# Patient Record
Sex: FEMALE | Race: WHITE | NOT HISPANIC OR LATINO
[De-identification: names, ages, dates, MRNs, and addresses within clinical notes are randomized per-mention and may not be internally consistent; named-entity substitution may affect disease eponyms.]

---

## 2017-02-07 ENCOUNTER — APPOINTMENT (OUTPATIENT)
Dept: SURGERY | Facility: CLINIC | Age: 32
End: 2017-02-07

## 2017-02-07 VITALS
HEIGHT: 69 IN | TEMPERATURE: 98.2 F | SYSTOLIC BLOOD PRESSURE: 127 MMHG | DIASTOLIC BLOOD PRESSURE: 86 MMHG | WEIGHT: 220 LBS | HEART RATE: 84 BPM | BODY MASS INDEX: 32.58 KG/M2

## 2017-02-08 ENCOUNTER — APPOINTMENT (OUTPATIENT)
Dept: ULTRASOUND IMAGING | Facility: CLINIC | Age: 32
End: 2017-02-08

## 2017-02-08 ENCOUNTER — OUTPATIENT (OUTPATIENT)
Dept: OUTPATIENT SERVICES | Facility: HOSPITAL | Age: 32
LOS: 1 days | End: 2017-02-08
Payer: COMMERCIAL

## 2017-02-08 DIAGNOSIS — Z00.8 ENCOUNTER FOR OTHER GENERAL EXAMINATION: ICD-10-CM

## 2017-02-08 PROCEDURE — 76700 US EXAM ABDOM COMPLETE: CPT

## 2017-02-10 ENCOUNTER — OUTPATIENT (OUTPATIENT)
Dept: OUTPATIENT SERVICES | Facility: HOSPITAL | Age: 32
LOS: 1 days | End: 2017-02-10

## 2017-02-10 VITALS
HEIGHT: 67 IN | HEART RATE: 68 BPM | RESPIRATION RATE: 16 BRPM | WEIGHT: 223.11 LBS | TEMPERATURE: 97 F | DIASTOLIC BLOOD PRESSURE: 76 MMHG | SYSTOLIC BLOOD PRESSURE: 104 MMHG

## 2017-02-10 DIAGNOSIS — K80.20 CALCULUS OF GALLBLADDER WITHOUT CHOLECYSTITIS WITHOUT OBSTRUCTION: ICD-10-CM

## 2017-02-10 DIAGNOSIS — N83.209 UNSPECIFIED OVARIAN CYST, UNSPECIFIED SIDE: Chronic | ICD-10-CM

## 2017-02-10 DIAGNOSIS — Z90.89 ACQUIRED ABSENCE OF OTHER ORGANS: Chronic | ICD-10-CM

## 2017-02-10 LAB
ALBUMIN SERPL ELPH-MCNC: 4.4 G/DL — SIGNIFICANT CHANGE UP (ref 3.3–5)
ALP SERPL-CCNC: 64 U/L — SIGNIFICANT CHANGE UP (ref 40–120)
ALT FLD-CCNC: 11 U/L — SIGNIFICANT CHANGE UP (ref 4–33)
AST SERPL-CCNC: 16 U/L — SIGNIFICANT CHANGE UP (ref 4–32)
BILIRUB SERPL-MCNC: 0.3 MG/DL — SIGNIFICANT CHANGE UP (ref 0.2–1.2)
BLD GP AB SCN SERPL QL: NEGATIVE — SIGNIFICANT CHANGE UP
BUN SERPL-MCNC: 9 MG/DL — SIGNIFICANT CHANGE UP (ref 7–23)
CALCIUM SERPL-MCNC: 9.1 MG/DL — SIGNIFICANT CHANGE UP (ref 8.4–10.5)
CHLORIDE SERPL-SCNC: 104 MMOL/L — SIGNIFICANT CHANGE UP (ref 98–107)
CO2 SERPL-SCNC: 25 MMOL/L — SIGNIFICANT CHANGE UP (ref 22–31)
CREAT SERPL-MCNC: 0.64 MG/DL — SIGNIFICANT CHANGE UP (ref 0.5–1.3)
GLUCOSE SERPL-MCNC: 59 MG/DL — LOW (ref 70–99)
HCT VFR BLD CALC: 41.3 % — SIGNIFICANT CHANGE UP (ref 34.5–45)
HGB BLD-MCNC: 13.9 G/DL — SIGNIFICANT CHANGE UP (ref 11.5–15.5)
MCHC RBC-ENTMCNC: 30.2 PG — SIGNIFICANT CHANGE UP (ref 27–34)
MCHC RBC-ENTMCNC: 33.7 % — SIGNIFICANT CHANGE UP (ref 32–36)
MCV RBC AUTO: 89.6 FL — SIGNIFICANT CHANGE UP (ref 80–100)
PLATELET # BLD AUTO: 303 K/UL — SIGNIFICANT CHANGE UP (ref 150–400)
PMV BLD: 11.1 FL — SIGNIFICANT CHANGE UP (ref 7–13)
POTASSIUM SERPL-MCNC: 4 MMOL/L — SIGNIFICANT CHANGE UP (ref 3.5–5.3)
POTASSIUM SERPL-SCNC: 4 MMOL/L — SIGNIFICANT CHANGE UP (ref 3.5–5.3)
PROT SERPL-MCNC: 7.5 G/DL — SIGNIFICANT CHANGE UP (ref 6–8.3)
RBC # BLD: 4.61 M/UL — SIGNIFICANT CHANGE UP (ref 3.8–5.2)
RBC # FLD: 13 % — SIGNIFICANT CHANGE UP (ref 10.3–14.5)
RH IG SCN BLD-IMP: POSITIVE — SIGNIFICANT CHANGE UP
SODIUM SERPL-SCNC: 143 MMOL/L — SIGNIFICANT CHANGE UP (ref 135–145)
WBC # BLD: 8.1 K/UL — SIGNIFICANT CHANGE UP (ref 3.8–10.5)
WBC # FLD AUTO: 8.1 K/UL — SIGNIFICANT CHANGE UP (ref 3.8–10.5)

## 2017-02-10 RX ORDER — SODIUM CHLORIDE 9 MG/ML
1000 INJECTION, SOLUTION INTRAVENOUS
Qty: 0 | Refills: 0 | Status: DISCONTINUED | OUTPATIENT
Start: 2017-02-15 | End: 2017-03-02

## 2017-02-10 NOTE — H&P PST ADULT - HISTORY OF PRESENT ILLNESS
32 y/o female presents stating that she had RUQ pain couple week ago, went to [facility], had sonogram done & multiple gallstone see. Pt was referred for further evaluation; seen by Dr Storey, now scheduled for Laparoscopic cholecystectomy, possible open procedure on 02/15/17.  Pt currently breast feeding.

## 2017-02-10 NOTE — H&P PST ADULT - PROBLEM SELECTOR PLAN 1
scheduled for lap mike, possible open on 02/15/17   pending labs; surgical scrub & preop instructions given &e xpleined, pt verbalized understanding

## 2017-02-10 NOTE — H&P PST ADULT - FAMILY HISTORY
Mother  Still living? Yes, Estimated age: Age Unknown  Family history of hypertension in mother, Age at diagnosis: Age Unknown

## 2017-02-10 NOTE — H&P PST ADULT - NEGATIVE GASTROINTESTINAL SYMPTOMS
no vomiting/no constipation/no abdominal pain/no diarrhea/no change in bowel habits/no melena/no nausea

## 2017-02-10 NOTE — H&P PST ADULT - NEGATIVE CARDIOVASCULAR SYMPTOMS
no chest pain/no palpitations/no paroxysmal nocturnal dyspnea/no orthopnea/no dyspnea on exertion/no peripheral edema

## 2017-02-10 NOTE — H&P PST ADULT - NSANTHOSAYNRD_GEN_A_CORE
No. DORIS screening performed.  STOP BANG Legend: 0-2 = LOW Risk; 3-4 = INTERMEDIATE Risk; 5-8 = HIGH Risk

## 2017-02-10 NOTE — H&P PST ADULT - NEGATIVE FEMALE-SPECIFIC SYMPTOMS
no abnormal vaginal bleeding/no menorrhagia/no pelvic pain/no vaginal discharge/no dysmenorrhea/no irregular menses/no spotting

## 2017-02-10 NOTE — H&P PST ADULT - RS GEN PE MLT RESP DETAILS PC
no rales/clear to auscultation bilaterally/airway patent/no rhonchi/breath sounds equal/respirations non-labored/good air movement

## 2017-02-14 ENCOUNTER — RESULT REVIEW (OUTPATIENT)
Age: 32
End: 2017-02-14

## 2017-02-15 ENCOUNTER — TRANSCRIPTION ENCOUNTER (OUTPATIENT)
Age: 32
End: 2017-02-15

## 2017-02-15 ENCOUNTER — OUTPATIENT (OUTPATIENT)
Dept: OUTPATIENT SERVICES | Facility: HOSPITAL | Age: 32
LOS: 1 days | Discharge: HOME | End: 2017-02-15
Payer: COMMERCIAL

## 2017-02-15 ENCOUNTER — APPOINTMENT (OUTPATIENT)
Dept: SURGERY | Facility: HOSPITAL | Age: 32
End: 2017-02-15

## 2017-02-15 VITALS
HEART RATE: 76 BPM | RESPIRATION RATE: 18 BRPM | WEIGHT: 223.11 LBS | DIASTOLIC BLOOD PRESSURE: 69 MMHG | OXYGEN SATURATION: 98 % | SYSTOLIC BLOOD PRESSURE: 124 MMHG | HEIGHT: 67 IN | TEMPERATURE: 98 F

## 2017-02-15 VITALS
SYSTOLIC BLOOD PRESSURE: 118 MMHG | HEART RATE: 76 BPM | RESPIRATION RATE: 16 BRPM | DIASTOLIC BLOOD PRESSURE: 71 MMHG | OXYGEN SATURATION: 98 %

## 2017-02-15 DIAGNOSIS — N83.209 UNSPECIFIED OVARIAN CYST, UNSPECIFIED SIDE: Chronic | ICD-10-CM

## 2017-02-15 DIAGNOSIS — Z90.89 ACQUIRED ABSENCE OF OTHER ORGANS: Chronic | ICD-10-CM

## 2017-02-15 DIAGNOSIS — K80.10 CALCULUS OF GALLBLADDER WITH CHRONIC CHOLECYSTITIS WITHOUT OBSTRUCTION: ICD-10-CM

## 2017-02-15 LAB
HCG UR QL: NEGATIVE — SIGNIFICANT CHANGE UP
RH IG SCN BLD-IMP: POSITIVE — SIGNIFICANT CHANGE UP

## 2017-02-15 PROCEDURE — 47562 LAPAROSCOPIC CHOLECYSTECTOMY: CPT

## 2017-02-15 PROCEDURE — 88304 TISSUE EXAM BY PATHOLOGIST: CPT | Mod: 26

## 2017-02-15 RX ORDER — OXYCODONE HYDROCHLORIDE 5 MG/1
10 TABLET ORAL EVERY 4 HOURS
Qty: 0 | Refills: 0 | Status: DISCONTINUED | OUTPATIENT
Start: 2017-02-15 | End: 2017-02-15

## 2017-02-15 RX ORDER — OXYCODONE HYDROCHLORIDE 5 MG/1
5 TABLET ORAL EVERY 4 HOURS
Qty: 0 | Refills: 0 | Status: DISCONTINUED | OUTPATIENT
Start: 2017-02-15 | End: 2017-02-15

## 2017-02-15 RX ORDER — ONDANSETRON 8 MG/1
4 TABLET, FILM COATED ORAL ONCE
Qty: 0 | Refills: 0 | Status: COMPLETED | OUTPATIENT
Start: 2017-02-15 | End: 2017-02-15

## 2017-02-15 RX ORDER — OXYCODONE HYDROCHLORIDE 5 MG/1
1 TABLET ORAL
Qty: 10 | Refills: 0 | OUTPATIENT
Start: 2017-02-15

## 2017-02-15 RX ORDER — FENTANYL CITRATE 50 UG/ML
50 INJECTION INTRAVENOUS
Qty: 0 | Refills: 0 | Status: DISCONTINUED | OUTPATIENT
Start: 2017-02-15 | End: 2017-02-16

## 2017-02-15 RX ORDER — SODIUM CHLORIDE 9 MG/ML
1000 INJECTION, SOLUTION INTRAVENOUS ONCE
Qty: 0 | Refills: 0 | Status: COMPLETED | OUTPATIENT
Start: 2017-02-15 | End: 2017-02-15

## 2017-02-15 RX ADMIN — ONDANSETRON 4 MILLIGRAM(S): 8 TABLET, FILM COATED ORAL at 18:50

## 2017-02-15 RX ADMIN — OXYCODONE HYDROCHLORIDE 10 MILLIGRAM(S): 5 TABLET ORAL at 18:31

## 2017-02-15 RX ADMIN — OXYCODONE HYDROCHLORIDE 10 MILLIGRAM(S): 5 TABLET ORAL at 19:00

## 2017-02-15 RX ADMIN — FENTANYL CITRATE 50 MICROGRAM(S): 50 INJECTION INTRAVENOUS at 16:45

## 2017-02-15 RX ADMIN — SODIUM CHLORIDE 75 MILLILITER(S): 9 INJECTION, SOLUTION INTRAVENOUS at 16:40

## 2017-02-15 RX ADMIN — SODIUM CHLORIDE 30 MILLILITER(S): 9 INJECTION, SOLUTION INTRAVENOUS at 13:44

## 2017-02-15 RX ADMIN — FENTANYL CITRATE 50 MICROGRAM(S): 50 INJECTION INTRAVENOUS at 17:16

## 2017-02-15 RX ADMIN — SODIUM CHLORIDE 2000 MILLILITER(S): 9 INJECTION, SOLUTION INTRAVENOUS at 19:04

## 2017-02-15 NOTE — ASU DISCHARGE PLAN (ADULT/PEDIATRIC). - MEDICATION SUMMARY - MEDICATIONS TO TAKE
I will START or STAY ON the medications listed below when I get home from the hospital:    Prenatal vitamin 1 tab orally daily  last dose 2/5  --     -- Indication: For Breast feeding    acetaminophen-oxyCODONE 325 mg-5 mg oral tablet  -- 1 tab(s) by mouth every 6 hours, As Needed MDD:4 tabs  -- Caution federal law prohibits the transfer of this drug to any person other  than the person for whom it was prescribed.  May cause drowsiness.  Alcohol may intensify this effect.  Use care when operating dangerous machinery.  This prescription cannot be refilled.  This product contains acetaminophen.  Do not use  with any other product containing acetaminophen to prevent possible liver damage.  Using more of this medication than prescribed may cause serious breathing problems.    -- Indication: For Pain; as needed

## 2017-02-15 NOTE — ASU DISCHARGE PLAN (ADULT/PEDIATRIC). - FOLLOWUP APPOINTMENT CLINIC/PHYSICIAN
Please follow up with Dr. Storey in one week. Please call to schedule an appointment for Tuesday 2/21.

## 2017-02-15 NOTE — ASU DISCHARGE PLAN (ADULT/PEDIATRIC). - DRIVING
Do not drive while taking narcotic pain medication. Do not drive while taking narcotic pain medication./No

## 2017-02-15 NOTE — ASU DISCHARGE PLAN (ADULT/PEDIATRIC). - NURSING INSTRUCTIONS
Stable for discharge  Do not take pain medication on an empty stomach.  Increase fluids and fiber in diet to prevent constipation.

## 2017-02-15 NOTE — ASU DISCHARGE PLAN (ADULT/PEDIATRIC). - NOTIFY
Fever greater than 101/Excessive Diarrhea/Persistent Nausea and Vomiting/Inability to Tolerate Liquids or Foods/Unable to Urinate Excessive Diarrhea/Fever greater than 101/Inability to Tolerate Liquids or Foods/Bleeding that does not stop/Unable to Urinate/Persistent Nausea and Vomiting

## 2017-02-21 ENCOUNTER — APPOINTMENT (OUTPATIENT)
Dept: SURGERY | Facility: CLINIC | Age: 32
End: 2017-02-21

## 2017-02-21 VITALS
WEIGHT: 220 LBS | DIASTOLIC BLOOD PRESSURE: 78 MMHG | TEMPERATURE: 97.6 F | HEART RATE: 97 BPM | HEIGHT: 69 IN | SYSTOLIC BLOOD PRESSURE: 122 MMHG | BODY MASS INDEX: 32.58 KG/M2

## 2017-02-21 DIAGNOSIS — K80.10 CALCULUS OF GALLBLADDER WITH CHRONIC CHOLECYSTITIS WITHOUT OBSTRUCTION: ICD-10-CM

## 2017-02-23 LAB — SURGICAL PATHOLOGY STUDY: SIGNIFICANT CHANGE UP

## 2017-04-03 ENCOUNTER — EMERGENCY (EMERGENCY)
Facility: HOSPITAL | Age: 32
LOS: 1 days | Discharge: LEFT BEFORE DISCHARGE | End: 2017-04-03
Admitting: EMERGENCY MEDICINE

## 2017-04-03 VITALS
HEART RATE: 67 BPM | RESPIRATION RATE: 16 BRPM | TEMPERATURE: 98 F | OXYGEN SATURATION: 98 % | SYSTOLIC BLOOD PRESSURE: 115 MMHG | DIASTOLIC BLOOD PRESSURE: 82 MMHG

## 2017-04-03 DIAGNOSIS — Z90.89 ACQUIRED ABSENCE OF OTHER ORGANS: Chronic | ICD-10-CM

## 2017-04-03 DIAGNOSIS — R10.9 UNSPECIFIED ABDOMINAL PAIN: ICD-10-CM

## 2017-04-03 DIAGNOSIS — N83.209 UNSPECIFIED OVARIAN CYST, UNSPECIFIED SIDE: Chronic | ICD-10-CM

## 2017-04-04 ENCOUNTER — LABORATORY RESULT (OUTPATIENT)
Age: 32
End: 2017-04-04

## 2017-04-04 ENCOUNTER — OUTPATIENT (OUTPATIENT)
Dept: OUTPATIENT SERVICES | Facility: HOSPITAL | Age: 32
LOS: 1 days | End: 2017-04-04
Payer: COMMERCIAL

## 2017-04-04 ENCOUNTER — APPOINTMENT (OUTPATIENT)
Dept: ULTRASOUND IMAGING | Facility: CLINIC | Age: 32
End: 2017-04-04

## 2017-04-04 ENCOUNTER — APPOINTMENT (OUTPATIENT)
Dept: SURGERY | Facility: CLINIC | Age: 32
End: 2017-04-04

## 2017-04-04 VITALS
SYSTOLIC BLOOD PRESSURE: 120 MMHG | HEART RATE: 83 BPM | DIASTOLIC BLOOD PRESSURE: 80 MMHG | HEIGHT: 69 IN | BODY MASS INDEX: 32.58 KG/M2 | WEIGHT: 220 LBS | TEMPERATURE: 98 F

## 2017-04-04 DIAGNOSIS — Z90.89 ACQUIRED ABSENCE OF OTHER ORGANS: Chronic | ICD-10-CM

## 2017-04-04 DIAGNOSIS — Z00.8 ENCOUNTER FOR OTHER GENERAL EXAMINATION: ICD-10-CM

## 2017-04-04 DIAGNOSIS — N83.209 UNSPECIFIED OVARIAN CYST, UNSPECIFIED SIDE: Chronic | ICD-10-CM

## 2017-04-04 PROCEDURE — 76700 US EXAM ABDOM COMPLETE: CPT

## 2017-06-16 ENCOUNTER — APPOINTMENT (OUTPATIENT)
Dept: INTERNAL MEDICINE | Facility: CLINIC | Age: 32
End: 2017-06-16

## 2017-06-16 DIAGNOSIS — K80.20 CALCULUS OF GALLBLADDER W/OUT CHOLECYSTITIS W/OUT OBSTRUCTION: ICD-10-CM

## 2017-06-16 DIAGNOSIS — Z09 ENCOUNTER FOR FOLLOW-UP EXAMINATION AFTER COMPLETED TREATMENT FOR CONDITIONS OTHER THAN MALIGNANT NEOPLASM: ICD-10-CM

## 2017-09-18 ENCOUNTER — APPOINTMENT (OUTPATIENT)
Dept: OBGYN | Facility: CLINIC | Age: 32
End: 2017-09-18

## 2018-01-16 ENCOUNTER — RESULT REVIEW (OUTPATIENT)
Age: 33
End: 2018-01-16

## 2018-01-17 ENCOUNTER — APPOINTMENT (OUTPATIENT)
Dept: OBGYN | Facility: CLINIC | Age: 33
End: 2018-01-17
Payer: COMMERCIAL

## 2018-01-17 PROCEDURE — 99395 PREV VISIT EST AGE 18-39: CPT

## 2018-04-10 ENCOUNTER — APPOINTMENT (OUTPATIENT)
Dept: GASTROENTEROLOGY | Facility: CLINIC | Age: 33
End: 2018-04-10
Payer: COMMERCIAL

## 2018-04-10 VITALS
RESPIRATION RATE: 17 BRPM | HEART RATE: 81 BPM | WEIGHT: 240 LBS | TEMPERATURE: 97.8 F | SYSTOLIC BLOOD PRESSURE: 128 MMHG | HEIGHT: 69 IN | BODY MASS INDEX: 35.55 KG/M2 | DIASTOLIC BLOOD PRESSURE: 83 MMHG | OXYGEN SATURATION: 98 %

## 2018-04-10 DIAGNOSIS — Z82.61 FAMILY HISTORY OF ARTHRITIS: ICD-10-CM

## 2018-04-10 DIAGNOSIS — F17.200 NICOTINE DEPENDENCE, UNSPECIFIED, UNCOMPLICATED: ICD-10-CM

## 2018-04-10 DIAGNOSIS — R10.13 EPIGASTRIC PAIN: ICD-10-CM

## 2018-04-10 DIAGNOSIS — K91.5 POSTCHOLECYSTECTOMY SYNDROME: ICD-10-CM

## 2018-04-10 PROCEDURE — 99204 OFFICE O/P NEW MOD 45 MIN: CPT

## 2018-05-04 ENCOUNTER — OUTPATIENT (OUTPATIENT)
Dept: OUTPATIENT SERVICES | Facility: HOSPITAL | Age: 33
LOS: 1 days | Discharge: HOME | End: 2018-05-04
Payer: COMMERCIAL

## 2018-05-04 ENCOUNTER — RESULT REVIEW (OUTPATIENT)
Age: 33
End: 2018-05-04

## 2018-05-04 ENCOUNTER — APPOINTMENT (OUTPATIENT)
Dept: GASTROENTEROLOGY | Facility: HOSPITAL | Age: 33
End: 2018-05-04
Payer: COMMERCIAL

## 2018-05-04 DIAGNOSIS — Z90.89 ACQUIRED ABSENCE OF OTHER ORGANS: Chronic | ICD-10-CM

## 2018-05-04 DIAGNOSIS — N83.209 UNSPECIFIED OVARIAN CYST, UNSPECIFIED SIDE: Chronic | ICD-10-CM

## 2018-05-04 DIAGNOSIS — K21.9 GASTRO-ESOPHAGEAL REFLUX DISEASE WITHOUT ESOPHAGITIS: ICD-10-CM

## 2018-05-04 LAB — HCG UR QL: NEGATIVE — SIGNIFICANT CHANGE UP

## 2018-05-04 PROCEDURE — 88305 TISSUE EXAM BY PATHOLOGIST: CPT | Mod: 26

## 2018-05-04 PROCEDURE — 88312 SPECIAL STAINS GROUP 1: CPT | Mod: 26

## 2018-05-04 PROCEDURE — 43239 EGD BIOPSY SINGLE/MULTIPLE: CPT

## 2018-05-08 LAB — SURGICAL PATHOLOGY STUDY: SIGNIFICANT CHANGE UP

## 2018-05-24 ENCOUNTER — APPOINTMENT (OUTPATIENT)
Dept: GASTROENTEROLOGY | Facility: CLINIC | Age: 33
End: 2018-05-24

## 2018-08-10 ENCOUNTER — APPOINTMENT (OUTPATIENT)
Dept: OBGYN | Facility: CLINIC | Age: 33
End: 2018-08-10

## 2018-11-07 ENCOUNTER — RESULT REVIEW (OUTPATIENT)
Age: 33
End: 2018-11-07

## 2018-11-08 ENCOUNTER — APPOINTMENT (OUTPATIENT)
Dept: OBGYN | Facility: CLINIC | Age: 33
End: 2018-11-08
Payer: COMMERCIAL

## 2018-11-08 PROCEDURE — 76817 TRANSVAGINAL US OBSTETRIC: CPT

## 2018-11-08 PROCEDURE — 36415 COLL VENOUS BLD VENIPUNCTURE: CPT

## 2018-11-08 PROCEDURE — 0500F INITIAL PRENATAL CARE VISIT: CPT

## 2018-11-12 ENCOUNTER — TRANSCRIPTION ENCOUNTER (OUTPATIENT)
Age: 33
End: 2018-11-12

## 2018-11-30 ENCOUNTER — APPOINTMENT (OUTPATIENT)
Dept: OBGYN | Facility: CLINIC | Age: 33
End: 2018-11-30
Payer: COMMERCIAL

## 2018-11-30 PROCEDURE — 99214 OFFICE O/P EST MOD 30 MIN: CPT

## 2018-11-30 PROCEDURE — 0502F SUBSEQUENT PRENATAL CARE: CPT

## 2018-12-01 RX ORDER — MISOPROSTOL 200 UG/1
2 TABLET ORAL
Qty: 2 | Refills: 0 | OUTPATIENT
Start: 2018-12-01 | End: 2018-12-01

## 2018-12-05 ENCOUNTER — APPOINTMENT (OUTPATIENT)
Dept: OBGYN | Facility: CLINIC | Age: 33
End: 2018-12-05

## 2018-12-10 ENCOUNTER — OUTPATIENT (OUTPATIENT)
Dept: OUTPATIENT SERVICES | Facility: HOSPITAL | Age: 33
LOS: 1 days | End: 2018-12-10
Payer: COMMERCIAL

## 2018-12-10 VITALS
OXYGEN SATURATION: 98 % | HEIGHT: 68 IN | WEIGHT: 238.98 LBS | RESPIRATION RATE: 20 BRPM | TEMPERATURE: 98 F | SYSTOLIC BLOOD PRESSURE: 117 MMHG | DIASTOLIC BLOOD PRESSURE: 78 MMHG | HEART RATE: 88 BPM

## 2018-12-10 DIAGNOSIS — Z90.49 ACQUIRED ABSENCE OF OTHER SPECIFIED PARTS OF DIGESTIVE TRACT: Chronic | ICD-10-CM

## 2018-12-10 DIAGNOSIS — Z90.89 ACQUIRED ABSENCE OF OTHER ORGANS: Chronic | ICD-10-CM

## 2018-12-10 DIAGNOSIS — Z01.818 ENCOUNTER FOR OTHER PREPROCEDURAL EXAMINATION: ICD-10-CM

## 2018-12-10 DIAGNOSIS — O02.1 MISSED ABORTION: ICD-10-CM

## 2018-12-10 DIAGNOSIS — N83.209 UNSPECIFIED OVARIAN CYST, UNSPECIFIED SIDE: Chronic | ICD-10-CM

## 2018-12-10 LAB
BLD GP AB SCN SERPL QL: NEGATIVE — SIGNIFICANT CHANGE UP
HCT VFR BLD CALC: 39.9 % — SIGNIFICANT CHANGE UP (ref 34.5–45)
HGB BLD-MCNC: 13.5 G/DL — SIGNIFICANT CHANGE UP (ref 11.5–15.5)
MCHC RBC-ENTMCNC: 29.7 PG — SIGNIFICANT CHANGE UP (ref 27–34)
MCHC RBC-ENTMCNC: 33.8 GM/DL — SIGNIFICANT CHANGE UP (ref 32–36)
MCV RBC AUTO: 87.9 FL — SIGNIFICANT CHANGE UP (ref 80–100)
PLATELET # BLD AUTO: 314 K/UL — SIGNIFICANT CHANGE UP (ref 150–400)
RBC # BLD: 4.54 M/UL — SIGNIFICANT CHANGE UP (ref 3.8–5.2)
RBC # FLD: 13.6 % — SIGNIFICANT CHANGE UP (ref 10.3–14.5)
RH IG SCN BLD-IMP: POSITIVE — SIGNIFICANT CHANGE UP
WBC # BLD: 8.23 K/UL — SIGNIFICANT CHANGE UP (ref 3.8–10.5)
WBC # FLD AUTO: 8.23 K/UL — SIGNIFICANT CHANGE UP (ref 3.8–10.5)

## 2018-12-10 PROCEDURE — G0463: CPT

## 2018-12-10 PROCEDURE — 85027 COMPLETE CBC AUTOMATED: CPT

## 2018-12-10 PROCEDURE — 86850 RBC ANTIBODY SCREEN: CPT

## 2018-12-10 PROCEDURE — 86901 BLOOD TYPING SEROLOGIC RH(D): CPT

## 2018-12-10 PROCEDURE — 86900 BLOOD TYPING SEROLOGIC ABO: CPT

## 2018-12-10 NOTE — H&P PST ADULT - PMH
Gallstones     (normal spontaneous vaginal delivery)  x 1 ( 2015) Gallstones    Obesity (BMI 30-39.9)

## 2018-12-10 NOTE — H&P PST ADULT - PSH
History of tonsillectomy    Ovarian cyst  Removal of cyst  S/P laparoscopic cholecystectomy History of tonsillectomy    Ovarian cyst  Removal of cyst  S/P laparoscopic cholecystectomy  2017

## 2018-12-10 NOTE — H&P PST ADULT - NS PRO PASSIVE SMOKE EXP
----- Message from Rex Yates MD sent at 9/16/2018  9:51 PM CDT -----  Please inform the pt her thyroid function is now normal and she should continue the same dosing and recheck if feeling abnormal otherwise in 6 more months.   No

## 2018-12-11 ENCOUNTER — APPOINTMENT (OUTPATIENT)
Dept: OBGYN | Facility: CLINIC | Age: 33
End: 2018-12-11
Payer: COMMERCIAL

## 2018-12-11 PROCEDURE — 36415 COLL VENOUS BLD VENIPUNCTURE: CPT

## 2018-12-12 ENCOUNTER — APPOINTMENT (OUTPATIENT)
Dept: OBGYN | Facility: HOSPITAL | Age: 33
End: 2018-12-12

## 2018-12-13 PROBLEM — E66.9 OBESITY, UNSPECIFIED: Chronic | Status: ACTIVE | Noted: 2018-12-10

## 2018-12-19 ENCOUNTER — APPOINTMENT (OUTPATIENT)
Dept: OBGYN | Facility: CLINIC | Age: 33
End: 2018-12-19
Payer: COMMERCIAL

## 2018-12-19 PROCEDURE — 36415 COLL VENOUS BLD VENIPUNCTURE: CPT

## 2018-12-21 ENCOUNTER — APPOINTMENT (OUTPATIENT)
Dept: OBGYN | Facility: CLINIC | Age: 33
End: 2018-12-21

## 2018-12-27 ENCOUNTER — APPOINTMENT (OUTPATIENT)
Dept: OBGYN | Facility: CLINIC | Age: 33
End: 2018-12-27
Payer: COMMERCIAL

## 2018-12-27 PROCEDURE — 36415 COLL VENOUS BLD VENIPUNCTURE: CPT

## 2018-12-31 ENCOUNTER — APPOINTMENT (OUTPATIENT)
Dept: OBGYN | Facility: CLINIC | Age: 33
End: 2018-12-31

## 2019-01-08 ENCOUNTER — APPOINTMENT (OUTPATIENT)
Dept: OBGYN | Facility: CLINIC | Age: 34
End: 2019-01-08
Payer: COMMERCIAL

## 2019-01-08 PROCEDURE — 36415 COLL VENOUS BLD VENIPUNCTURE: CPT

## 2019-03-04 ENCOUNTER — TRANSCRIPTION ENCOUNTER (OUTPATIENT)
Age: 34
End: 2019-03-04

## 2019-03-06 ENCOUNTER — APPOINTMENT (OUTPATIENT)
Dept: OBGYN | Facility: CLINIC | Age: 34
End: 2019-03-06
Payer: COMMERCIAL

## 2019-03-06 PROCEDURE — 99213 OFFICE O/P EST LOW 20 MIN: CPT | Mod: 25

## 2019-03-06 PROCEDURE — 36415 COLL VENOUS BLD VENIPUNCTURE: CPT

## 2019-03-06 PROCEDURE — 76817 TRANSVAGINAL US OBSTETRIC: CPT

## 2019-03-16 ENCOUNTER — EMERGENCY (EMERGENCY)
Facility: HOSPITAL | Age: 34
LOS: 1 days | Discharge: HOME | End: 2019-03-16
Attending: EMERGENCY MEDICINE
Payer: COMMERCIAL

## 2019-03-16 VITALS
TEMPERATURE: 98 F | OXYGEN SATURATION: 98 % | RESPIRATION RATE: 18 BRPM | SYSTOLIC BLOOD PRESSURE: 126 MMHG | DIASTOLIC BLOOD PRESSURE: 83 MMHG | HEART RATE: 85 BPM

## 2019-03-16 DIAGNOSIS — Z90.49 ACQUIRED ABSENCE OF OTHER SPECIFIED PARTS OF DIGESTIVE TRACT: Chronic | ICD-10-CM

## 2019-03-16 DIAGNOSIS — Z90.89 ACQUIRED ABSENCE OF OTHER ORGANS: Chronic | ICD-10-CM

## 2019-03-16 DIAGNOSIS — N83.209 UNSPECIFIED OVARIAN CYST, UNSPECIFIED SIDE: Chronic | ICD-10-CM

## 2019-03-16 LAB
ALBUMIN SERPL ELPH-MCNC: 3.6 G/DL — SIGNIFICANT CHANGE UP (ref 3.5–5)
ALP SERPL-CCNC: 55 U/L — SIGNIFICANT CHANGE UP (ref 40–120)
ALT FLD-CCNC: 52 U/L DA — SIGNIFICANT CHANGE UP (ref 10–60)
ANION GAP SERPL CALC-SCNC: 6 MMOL/L — SIGNIFICANT CHANGE UP (ref 5–17)
APPEARANCE UR: ABNORMAL
AST SERPL-CCNC: 30 U/L — SIGNIFICANT CHANGE UP (ref 10–40)
BILIRUB SERPL-MCNC: 0.4 MG/DL — SIGNIFICANT CHANGE UP (ref 0.2–1.2)
BILIRUB UR-MCNC: NEGATIVE — SIGNIFICANT CHANGE UP
BUN SERPL-MCNC: 9 MG/DL — SIGNIFICANT CHANGE UP (ref 7–18)
CALCIUM SERPL-MCNC: 8.4 MG/DL — SIGNIFICANT CHANGE UP (ref 8.4–10.5)
CHLORIDE SERPL-SCNC: 106 MMOL/L — SIGNIFICANT CHANGE UP (ref 96–108)
CO2 SERPL-SCNC: 27 MMOL/L — SIGNIFICANT CHANGE UP (ref 22–31)
COLOR SPEC: YELLOW — SIGNIFICANT CHANGE UP
CREAT SERPL-MCNC: 0.6 MG/DL — SIGNIFICANT CHANGE UP (ref 0.5–1.3)
DIFF PNL FLD: ABNORMAL
GLUCOSE SERPL-MCNC: 71 MG/DL — SIGNIFICANT CHANGE UP (ref 70–99)
GLUCOSE UR QL: NEGATIVE — SIGNIFICANT CHANGE UP
HCG SERPL-ACNC: HIGH MIU/ML
HCT VFR BLD CALC: 39.6 % — SIGNIFICANT CHANGE UP (ref 34.5–45)
HGB BLD-MCNC: 13.1 G/DL — SIGNIFICANT CHANGE UP (ref 11.5–15.5)
KETONES UR-MCNC: NEGATIVE — SIGNIFICANT CHANGE UP
LEUKOCYTE ESTERASE UR-ACNC: ABNORMAL
MCHC RBC-ENTMCNC: 30 PG — SIGNIFICANT CHANGE UP (ref 27–34)
MCHC RBC-ENTMCNC: 33.1 GM/DL — SIGNIFICANT CHANGE UP (ref 32–36)
MCV RBC AUTO: 90.6 FL — SIGNIFICANT CHANGE UP (ref 80–100)
NITRITE UR-MCNC: NEGATIVE — SIGNIFICANT CHANGE UP
NRBC # BLD: 0 /100 WBCS — SIGNIFICANT CHANGE UP (ref 0–0)
PH UR: 7 — SIGNIFICANT CHANGE UP (ref 5–8)
PLATELET # BLD AUTO: 304 K/UL — SIGNIFICANT CHANGE UP (ref 150–400)
POTASSIUM SERPL-MCNC: 3.9 MMOL/L — SIGNIFICANT CHANGE UP (ref 3.5–5.3)
POTASSIUM SERPL-SCNC: 3.9 MMOL/L — SIGNIFICANT CHANGE UP (ref 3.5–5.3)
PROT SERPL-MCNC: 7.5 G/DL — SIGNIFICANT CHANGE UP (ref 6–8.3)
PROT UR-MCNC: NEGATIVE — SIGNIFICANT CHANGE UP
RBC # BLD: 4.37 M/UL — SIGNIFICANT CHANGE UP (ref 3.8–5.2)
RBC # FLD: 12.7 % — SIGNIFICANT CHANGE UP (ref 10.3–14.5)
SODIUM SERPL-SCNC: 139 MMOL/L — SIGNIFICANT CHANGE UP (ref 135–145)
SP GR SPEC: 1.01 — SIGNIFICANT CHANGE UP (ref 1.01–1.02)
UROBILINOGEN FLD QL: 1
WBC # BLD: 9.99 K/UL — SIGNIFICANT CHANGE UP (ref 3.8–10.5)
WBC # FLD AUTO: 9.99 K/UL — SIGNIFICANT CHANGE UP (ref 3.8–10.5)

## 2019-03-16 PROCEDURE — 87086 URINE CULTURE/COLONY COUNT: CPT

## 2019-03-16 PROCEDURE — 86900 BLOOD TYPING SEROLOGIC ABO: CPT

## 2019-03-16 PROCEDURE — 76830 TRANSVAGINAL US NON-OB: CPT

## 2019-03-16 PROCEDURE — 99284 EMERGENCY DEPT VISIT MOD MDM: CPT | Mod: 25

## 2019-03-16 PROCEDURE — 86901 BLOOD TYPING SEROLOGIC RH(D): CPT

## 2019-03-16 PROCEDURE — 84702 CHORIONIC GONADOTROPIN TEST: CPT

## 2019-03-16 PROCEDURE — 81001 URINALYSIS AUTO W/SCOPE: CPT

## 2019-03-16 PROCEDURE — 76801 OB US < 14 WKS SINGLE FETUS: CPT | Mod: 26

## 2019-03-16 PROCEDURE — 80053 COMPREHEN METABOLIC PANEL: CPT

## 2019-03-16 PROCEDURE — 76801 OB US < 14 WKS SINGLE FETUS: CPT

## 2019-03-16 PROCEDURE — 36415 COLL VENOUS BLD VENIPUNCTURE: CPT

## 2019-03-16 PROCEDURE — 76817 TRANSVAGINAL US OBSTETRIC: CPT | Mod: 26

## 2019-03-16 PROCEDURE — 86850 RBC ANTIBODY SCREEN: CPT

## 2019-03-16 PROCEDURE — 99285 EMERGENCY DEPT VISIT HI MDM: CPT

## 2019-03-16 PROCEDURE — 85027 COMPLETE CBC AUTOMATED: CPT

## 2019-03-16 NOTE — ED PROVIDER NOTE - PROGRESS NOTE DETAILS
Fetal demise noted on US and per HCG. Will f/u with OB Pt is well appearing walking with steady gait, stable for discharge and follow up without fail with medical doctor. I had a detailed discussion with the patient and/or guardian regarding the historical points, exam findings, and any diagnostic results supporting the discharge diagnosis. Pt educated on care and need for follow up. Strict return instructions and red flag signs and symptoms discussed with patient. Questions answered. Pt shows understanding of discharge information and agrees to follow.

## 2019-03-16 NOTE — ED PROVIDER NOTE - CLINICAL SUMMARY MEDICAL DECISION MAKING FREE TEXT BOX
Pt with EGA 10 weeks with lower abd cramping and vaginal spotting. Will check labs, UA, US, and reevaluate.

## 2019-03-16 NOTE — ED PROVIDER NOTE - OBJECTIVE STATEMENT
34 y/o female (A1) with PSHx of left sided ovarian cyst removal presents to the ED with c/o intermittent abd cramping since 6 days ago. Pt also notes brown vaginal discharge starting at the same time. Sx improved over the next few days but then returned. Pt had US done by OB physician at 8.5 weeks gestation and was told everything appeared normal. Pt also notes mild lower back pain. Pt notes that she had a miscarriage in December and 1 vaginal child birth in the past. Pt has never had an ectopic pregnancy. Pt denies any urinary Sx or other complaints.

## 2019-03-16 NOTE — ED ADULT NURSE NOTE - OBJECTIVE STATEMENT
c/o abdominal cramps today. 10 weeks pregnant, 11:00am  noticed blood when she wiped herself  .Had a miscarriage 12/11/18.

## 2019-03-16 NOTE — ED ADULT NURSE NOTE - NSIMPLEMENTINTERV_GEN_ALL_ED
Implemented All Universal Safety Interventions:  [place] to call system. Call bell, personal items and telephone within reach. Instruct patient to call for assistance. Room bathroom lighting operational. Non-slip footwear when patient is off stretcher. Physically safe environment: no spills, clutter or unnecessary equipment. Stretcher in lowest position, wheels locked, appropriate side rails in place.

## 2019-03-18 ENCOUNTER — APPOINTMENT (OUTPATIENT)
Dept: OBGYN | Facility: CLINIC | Age: 34
End: 2019-03-18
Payer: COMMERCIAL

## 2019-03-18 ENCOUNTER — APPOINTMENT (OUTPATIENT)
Dept: OBGYN | Facility: CLINIC | Age: 34
End: 2019-03-18

## 2019-03-18 LAB
CULTURE RESULTS: SIGNIFICANT CHANGE UP
SPECIMEN SOURCE: SIGNIFICANT CHANGE UP

## 2019-03-18 PROCEDURE — 99213 OFFICE O/P EST LOW 20 MIN: CPT | Mod: 25

## 2019-03-18 PROCEDURE — 76817 TRANSVAGINAL US OBSTETRIC: CPT

## 2019-03-19 ENCOUNTER — TRANSCRIPTION ENCOUNTER (OUTPATIENT)
Age: 34
End: 2019-03-19

## 2019-03-19 ENCOUNTER — OUTPATIENT (OUTPATIENT)
Dept: OUTPATIENT SERVICES | Facility: HOSPITAL | Age: 34
LOS: 1 days | End: 2019-03-19
Payer: COMMERCIAL

## 2019-03-19 VITALS
TEMPERATURE: 98 F | DIASTOLIC BLOOD PRESSURE: 74 MMHG | OXYGEN SATURATION: 99 % | RESPIRATION RATE: 16 BRPM | SYSTOLIC BLOOD PRESSURE: 120 MMHG | HEIGHT: 68 IN | HEART RATE: 88 BPM | WEIGHT: 248.02 LBS

## 2019-03-19 DIAGNOSIS — O02.1 MISSED ABORTION: ICD-10-CM

## 2019-03-19 DIAGNOSIS — Z01.818 ENCOUNTER FOR OTHER PREPROCEDURAL EXAMINATION: ICD-10-CM

## 2019-03-19 DIAGNOSIS — Z90.49 ACQUIRED ABSENCE OF OTHER SPECIFIED PARTS OF DIGESTIVE TRACT: Chronic | ICD-10-CM

## 2019-03-19 DIAGNOSIS — Z90.89 ACQUIRED ABSENCE OF OTHER ORGANS: Chronic | ICD-10-CM

## 2019-03-19 DIAGNOSIS — N83.209 UNSPECIFIED OVARIAN CYST, UNSPECIFIED SIDE: Chronic | ICD-10-CM

## 2019-03-19 LAB
BLD GP AB SCN SERPL QL: NEGATIVE — SIGNIFICANT CHANGE UP
HCT VFR BLD CALC: 38.9 % — SIGNIFICANT CHANGE UP (ref 34.5–45)
HGB BLD-MCNC: 13.1 G/DL — SIGNIFICANT CHANGE UP (ref 11.5–15.5)
MCHC RBC-ENTMCNC: 30.1 PG — SIGNIFICANT CHANGE UP (ref 27–34)
MCHC RBC-ENTMCNC: 33.7 GM/DL — SIGNIFICANT CHANGE UP (ref 32–36)
MCV RBC AUTO: 89.4 FL — SIGNIFICANT CHANGE UP (ref 80–100)
PLATELET # BLD AUTO: 291 K/UL — SIGNIFICANT CHANGE UP (ref 150–400)
RBC # BLD: 4.35 M/UL — SIGNIFICANT CHANGE UP (ref 3.8–5.2)
RBC # FLD: 12.6 % — SIGNIFICANT CHANGE UP (ref 10.3–14.5)
RH IG SCN BLD-IMP: POSITIVE — SIGNIFICANT CHANGE UP
WBC # BLD: 9.41 K/UL — SIGNIFICANT CHANGE UP (ref 3.8–10.5)
WBC # FLD AUTO: 9.41 K/UL — SIGNIFICANT CHANGE UP (ref 3.8–10.5)

## 2019-03-19 PROCEDURE — 86901 BLOOD TYPING SEROLOGIC RH(D): CPT

## 2019-03-19 PROCEDURE — 86900 BLOOD TYPING SEROLOGIC ABO: CPT

## 2019-03-19 PROCEDURE — 86850 RBC ANTIBODY SCREEN: CPT

## 2019-03-19 PROCEDURE — 85027 COMPLETE CBC AUTOMATED: CPT

## 2019-03-19 PROCEDURE — G0463: CPT

## 2019-03-19 RX ORDER — SODIUM CHLORIDE 9 MG/ML
3 INJECTION INTRAMUSCULAR; INTRAVENOUS; SUBCUTANEOUS EVERY 8 HOURS
Qty: 0 | Refills: 0 | Status: DISCONTINUED | OUTPATIENT
Start: 2019-03-20 | End: 2019-04-04

## 2019-03-19 RX ORDER — ONDANSETRON 8 MG/1
4 TABLET, FILM COATED ORAL ONCE
Qty: 0 | Refills: 0 | Status: DISCONTINUED | OUTPATIENT
Start: 2019-03-20 | End: 2019-04-04

## 2019-03-19 RX ORDER — LIDOCAINE HCL 20 MG/ML
0.2 VIAL (ML) INJECTION ONCE
Qty: 0 | Refills: 0 | Status: DISCONTINUED | OUTPATIENT
Start: 2019-03-20 | End: 2019-04-04

## 2019-03-19 RX ORDER — CELECOXIB 200 MG/1
200 CAPSULE ORAL ONCE
Qty: 0 | Refills: 0 | Status: DISCONTINUED | OUTPATIENT
Start: 2019-03-20 | End: 2019-04-04

## 2019-03-19 RX ORDER — SODIUM CHLORIDE 9 MG/ML
1000 INJECTION, SOLUTION INTRAVENOUS
Qty: 0 | Refills: 0 | Status: DISCONTINUED | OUTPATIENT
Start: 2019-03-20 | End: 2019-04-04

## 2019-03-19 RX ORDER — OXYCODONE HYDROCHLORIDE 5 MG/1
5 TABLET ORAL ONCE
Qty: 0 | Refills: 0 | Status: DISCONTINUED | OUTPATIENT
Start: 2019-03-20 | End: 2019-03-20

## 2019-03-19 NOTE — H&P PST ADULT - NSICDXPASTSURGICALHX_GEN_ALL_CORE_FT
PAST SURGICAL HISTORY:  History of tonsillectomy     Ovarian cyst Removal of cyst    S/P laparoscopic cholecystectomy 2017 PAST SURGICAL HISTORY:  History of tonsillectomy childhood    Ovarian cyst ' 19 Removal of cyst: Right    S/P laparoscopic cholecystectomy 2017

## 2019-03-19 NOTE — H&P PST ADULT - HISTORY OF PRESENT ILLNESS
·	This is a 33 year old female, , with PMH: s/p (2018): Spontaneous Missed . Current dx: Missed  approximately 9 weeks. Scheduled: D+C for Missed . This is a 33 year old female, , with PMH: s/p (2018): Spontaneous Missed . Current dx: Missed  approximately 9 weeks. Scheduled: D+C  for Missed .

## 2019-03-19 NOTE — H&P PST ADULT - NSICDXPASTMEDICALHX_GEN_ALL_CORE_FT
PAST MEDICAL HISTORY:  Gallstones     Obesity (BMI 30-39.9) PAST MEDICAL HISTORY:  Gallstones '17     (normal spontaneous vaginal delivery) '16    Obesity (BMI 30-39.9)     Ovarian cyst age 18  Right

## 2019-03-19 NOTE — H&P PST ADULT - NSICDXFAMILYHX_GEN_ALL_CORE_FT
FAMILY HISTORY:  Mother  Still living? Yes, Estimated age: Age Unknown  Family history of hypertension in mother, Age at diagnosis: Age Unknown

## 2019-03-20 ENCOUNTER — OUTPATIENT (OUTPATIENT)
Dept: OUTPATIENT SERVICES | Facility: HOSPITAL | Age: 34
LOS: 1 days | End: 2019-03-20
Payer: COMMERCIAL

## 2019-03-20 ENCOUNTER — APPOINTMENT (OUTPATIENT)
Dept: OBGYN | Facility: HOSPITAL | Age: 34
End: 2019-03-20

## 2019-03-20 ENCOUNTER — RESULT REVIEW (OUTPATIENT)
Age: 34
End: 2019-03-20

## 2019-03-20 VITALS
SYSTOLIC BLOOD PRESSURE: 114 MMHG | OXYGEN SATURATION: 100 % | DIASTOLIC BLOOD PRESSURE: 70 MMHG | RESPIRATION RATE: 14 BRPM | HEART RATE: 75 BPM

## 2019-03-20 VITALS
HEIGHT: 68 IN | WEIGHT: 248.02 LBS | SYSTOLIC BLOOD PRESSURE: 109 MMHG | OXYGEN SATURATION: 98 % | TEMPERATURE: 98 F | DIASTOLIC BLOOD PRESSURE: 73 MMHG | HEART RATE: 79 BPM | RESPIRATION RATE: 18 BRPM

## 2019-03-20 DIAGNOSIS — O02.1 MISSED ABORTION: ICD-10-CM

## 2019-03-20 DIAGNOSIS — Z90.89 ACQUIRED ABSENCE OF OTHER ORGANS: Chronic | ICD-10-CM

## 2019-03-20 DIAGNOSIS — N83.209 UNSPECIFIED OVARIAN CYST, UNSPECIFIED SIDE: Chronic | ICD-10-CM

## 2019-03-20 DIAGNOSIS — Z90.49 ACQUIRED ABSENCE OF OTHER SPECIFIED PARTS OF DIGESTIVE TRACT: Chronic | ICD-10-CM

## 2019-03-20 PROBLEM — K80.20 CALCULUS OF GALLBLADDER WITHOUT CHOLECYSTITIS WITHOUT OBSTRUCTION: Chronic | Status: ACTIVE | Noted: 2017-02-10

## 2019-03-20 PROCEDURE — 88305 TISSUE EXAM BY PATHOLOGIST: CPT

## 2019-03-20 PROCEDURE — 88233 TISSUE CULTURE SKIN/BIOPSY: CPT

## 2019-03-20 PROCEDURE — 88291 CYTO/MOLECULAR REPORT: CPT

## 2019-03-20 PROCEDURE — 88280 CHROMOSOME KARYOTYPE STUDY: CPT

## 2019-03-20 PROCEDURE — 88305 TISSUE EXAM BY PATHOLOGIST: CPT | Mod: 26

## 2019-03-20 PROCEDURE — 59820 CARE OF MISCARRIAGE: CPT

## 2019-03-20 PROCEDURE — 88264 CHROMOSOME ANALYSIS 20-25: CPT

## 2019-03-20 RX ORDER — CELECOXIB 200 MG/1
200 CAPSULE ORAL ONCE
Qty: 0 | Refills: 0 | Status: COMPLETED | OUTPATIENT
Start: 2019-03-20 | End: 2019-03-20

## 2019-03-20 RX ORDER — ACETAMINOPHEN 500 MG
1000 TABLET ORAL ONCE
Qty: 0 | Refills: 0 | Status: COMPLETED | OUTPATIENT
Start: 2019-03-20 | End: 2019-03-20

## 2019-03-20 RX ADMIN — CELECOXIB 200 MILLIGRAM(S): 200 CAPSULE ORAL at 07:34

## 2019-03-20 RX ADMIN — Medication 1000 MILLIGRAM(S): at 07:34

## 2019-03-20 NOTE — ASU DISCHARGE PLAN (ADULT/PEDIATRIC) - CALL YOUR DOCTOR IF YOU HAVE ANY OF THE FOLLOWING:
Bleeding that does not stop/Fever greater than (need to indicate Fahrenheit or Celsius) Fever greater than (need to indicate Fahrenheit or Celsius)/Pain not relieved by Medications/Bleeding that does not stop

## 2019-03-20 NOTE — ASU DISCHARGE PLAN (ADULT/PEDIATRIC) - CARE PROVIDER_API CALL
Consuelo Cheek (DO)  Obstetrics and Gynecology  [address] [place] [address], [address]  [place] [address]  Phone: [phone]  Fax: [phone]  Follow Up Time:

## 2019-03-22 LAB — SURGICAL PATHOLOGY STUDY: SIGNIFICANT CHANGE UP

## 2019-03-26 ENCOUNTER — APPOINTMENT (OUTPATIENT)
Dept: OBGYN | Facility: CLINIC | Age: 34
End: 2019-03-26

## 2019-04-04 LAB — CHROM ANALY OVERALL INTERP SPEC-IMP: SIGNIFICANT CHANGE UP

## 2019-04-16 ENCOUNTER — APPOINTMENT (OUTPATIENT)
Dept: OBGYN | Facility: CLINIC | Age: 34
End: 2019-04-16
Payer: COMMERCIAL

## 2019-04-16 PROCEDURE — 99024 POSTOP FOLLOW-UP VISIT: CPT

## 2019-08-22 ENCOUNTER — TRANSCRIPTION ENCOUNTER (OUTPATIENT)
Age: 34
End: 2019-08-22

## 2019-12-28 NOTE — H&P PST ADULT - ABILITY TO HEAR (WITH HEARING AID OR HEARING APPLIANCE IF NORMALLY USED):
Patient:   ALIVIA EDOUARD            MRN: [id number]            FIN: [id number]              Age:   89 years     Sex:  MALE     :  34   Associated Diagnoses:   None   Author:   JUAN COBB     Subjective   Chief complaint He is slightly better and is more alert. He may for the Bronchial biopsy  His abdomen is still distended  as per the nurse, the surgeon has recommended to give the suppository Started on TPN.       Health Status   Allergies:    Allergies (1) Active Reaction  Avelox None Documented    Current medications:    Medications (26) Active  Scheduled: (17)  Albuterol-ipratropium 2.5-0.5 mg/3 mL nebulizer soln  3 mL, Nebulizer, Q4H  Atorvastatin 10 mg tab  10 mg 1 tab, Oral, Daily  Budesonide 0.5 mg/2 mL nebulizer susp  0.5 mg 2 mL, Nebulizer, Q12H  cefepime  1,000 mg, IVPB, Q12H  DilTIAZem 300 mg CD cap  300 mg 1 cap, Oral, Daily  Famotidine 20 mg/2 mL inj SDV  20 mg 2 mL, Slow IV Push, Q Bedtime  Finasteride 5 mg tab  5 mg 1 tab, Oral, Q Evening  GuaiFENesin 600 mg LA tab  600 mg 1 tab, Oral, Q12H  Heparin 5,000 unit/1 mL inj  5,000 unit 1 mL, Subcutaneous, Q12H  HydrALAZINE 50 mg tab  50 mg 1 tab, Oral, Q8H  Influenza virus vaccine, inactivated PF (latex free) quadrivalent 0.5 mL IM inj SDV  0.5 mL, IM, On Call  MethylPREDNISolone Na succ PF 40 mg/1 mL inj SDV  40 mg 1 mL, Slow IV Push, Q12H  MetoCLOPramide 10 mg/2 mL inj SDV  5 mg 1 mL, Slow IV Push, Q12H  metroNIDAZOLE-NaCl 0.9%  500 mg 100 mL, IVPB, Q8H  Montelukast 10 mg tab  10 mg 1 tab, Oral, Q Evening  Sodium chloride PF 0.9% flush inj 10 mL  10 mL, Flush, Q12H  Tamsulosin 0.4 mg cap  0.4 mg 1 cap, Oral, Daily  Continuous: (3)  fat emulsion 20% 250 mL  250 mL, IVPB, 30 mL/hr  TPN adult standard CENTRAL LINE 2,000 mL  2,000 mL, IV, 83.33 mL/hr  TPN adult standard CENTRAL LINE 2,000 mL  2,000 mL, IV, 83.33 mL/hr  PRN: (6)  Acetaminophen 325 mg tab  650 mg 2 tab, Oral, Q4H  Docusate sodium 100 mg cap  100 mg 1 cap, Oral,  BID  Guaifenesin--20 mg/10 mL oral liquid UD  10 mL, Oral, Q6H  HydrALAZINE 20 mg/1 mL inj SDV  10 mg 0.5 mL, Slow IV Push, Q6H  Sodium chloride PF 0.9% flush inj 10 mL  10 mL, Flush, As Directed PRN  Sodium chloride PF 0.9% flush inj 10 mL  20 mL, Flush, As Directed PRN  ,   Home Medications (8) Active  albuterol inhalation 0.083% 2.5 mg/3 mL solution (Proventil) 2.5 mg = 3 mL, PRN, Nebulizer, Q6H  aspirin oral 81 mg DR tablet (Ecotrin Low Strength) 81 mg = 1 tab, Oral, Daily  atorvastatin oral 10 mg tablet 10 mg = 1 tab, Oral, Daily  Avodart oral 0.5 mg capsule 0.5 mg = 1 cap, Oral, Daily  Cardizem CD oral 240 mg capsule 240 mg = 1 cap, Oral, Daily  Proventil HFA inhaler oral 90 mcg/puff 180 mcg = 2 puff, PRN, MDI/DPI, Q6H  Singulair oral 10 mg tablet 10 mg = 1 tab, Oral, Q Evening  tamsulosin oral 0.4 mg capsule 0.4 mg = 1 cap, Oral, Daily      Tobacco use: Alcohol  Details: Use: None.  Details: None  Exercise  Details: Excercise: Regularly.; Comment(s): biking  Home/Environment  Details: Alcohol abuse in household: No.  Substance abuse in household: No.  Smoker in household: No.  Details: Alcohol Abuse in Household: Yes.  Substance Abuse in Household: Yes.  Smoker in Household: Yes.  Patient Lives With: Alone.  Living Situation: Lives Alone.  Ambulation: Independent.  Bathing: Independent.  Dressing: Independent.  Driving: Independent.  Eating: Independent.  Elimination: Independent.  Grooming: Independent.  Preparing Meal: Independent.  Taking Meds: Independent.  Toileting: Independent.  Transfers: Independent.   Current Home Treatments: Nebulizer Treatments, Home Nursing.  Home Care Agency: [facility], [address], [address] [place] [address], [phone].  Rehab Consulted No.  Substance Abuse  Details: Use: None.  Details: None  Details: Past, Marijuana, many years ago just tried couple times  Tobacco  Details: Smoked/Smokeless Tobacco Last 30 Days: No.  Smoking Tobacco Use: Former smoker.   Smokeless Tobacco Use Never.  Details: No, Former smoker  Details: No, Former smoker  Details: Used in Last 12 Months: No.  Use: Former smoker.  Type: Cigarettes.  Cultural/Church Practices  Details: Church or Cultural Practices While in Hospital: No.        Objective   Intake and Output   I&O 24 hr   I & O between:  27-DEC-2019 15:53 TO 28-DEC-2019 15:53  Med Dosing Weight:  76.4  kg   27-DEC-2019  24 Hour Intake:   530.50  ( 6.94 mL/kg )  24 Hour Output:   2455.00           24 Hour Urine/Stool Output:   0.0  24 Hour Balance:   -1924.50           24 Hour Urine Output:   2455.00  ( 1.34 mL/kg/hr )    VS/Measurements     Vitals between:   27-DEC-2019 15:53:13   TO   28-DEC-2019 15:53:13                   LAST RESULT MINIMUM MAXIMUM  Temperature 36.3 36.3 37  Heart Rate 89 81 108  Respiratory Rate 18 16 18  NISBP           171 171 201  NIDBP           67 63 84  NIMBP           114 114 114  SpO2                    100 99 100    General:  Alert and oriented.    Respiratory:  Lungs are clear to auscultation, Respirations are non-labored.   Cardiovascular:  Normal rate, Regular rhythm.    Gastrointestinal:  Soft, Non-tender, Distended, but improved.    Neurologic:  Alert, Oriented.    Psychiatric:  Cooperative, Appropriate mood & affect.      Results Review   General results   Today's results   12/28/19 04:28 CST Sodium 141 mmol/L    Potassium 4.7 mmol/L    Chloride 109 mmol/L  HI    Carbon Dioxide (CO2) 25 mmol/L    Anion Gap 12 mmol/L    BUN 47 mg/dL  HI    Creatinine 1.07 mg/dL    BUN/Creatinine Ratio 44  HI    GFR ESTIMATE  72    GFR ESTIMATE NON  62    Calcium 7.6 mg/dL  LOW    Magnesium 2.2 mg/dL    Phosphorus 3.1 mg/dL    Glucose Level 149 mg/dL  HI    WBC 16.4 THOUSAND/mcL  HI    RBC 3.76 MILLION/mcL  LOW    Hemoglobin 10.8 gm/dL  LOW    Hematocrit 33.4 %  LOW    MCV 88.8 fL    MCH 28.7 pg    MCHC 32.3 gm/dL    RDW-CV 13.5 %    Platelet 131 THOUSAND/mcL  LOW    Neutrophils 88  %    Abs Neut 14.4 THOUSAND/mcL  HI    Segs NOT APPLICABLE    Lymph 4 %    Absolute Lymph 0.7 THOUSAND/mcL  LOW    Monocytes 5 %    Abs Mono 0.8 THOUSAND/mcL    Eosinophils 0 %    Absolute Eos 0.0 THOUSAND/mcL  LOW    Basophils 0 %    Absolute Baso 0.0 THOUSAND/mcL    Analyzer ANC NOT APPLICABLE    Percent Immature Granulocytes 3 %    Absolute Immature Granulocytes 0.5 THOUSAND/mcL  HI    NRBC 0 /100 WBC    Urinary Catheter Indication for Use Acute Urinary Retention     Interpretation:   Improved renal function              Impression and Plan   Assessment and Plan:          Diagnosis:    Assessment/Plan:     LEONA (acute kidney injury)      _     Acute asthma exacerbation      _     COPD exacerbation      _     Collapse of lung      _     Hilar mass      _     Hypertension not at goal      _     Leucocytosis      _     S/p laparotomy      _     SBO (small bowel obstruction)      _     Shortness of breath      _     Urinary retention due to benign prostatic hyperplasia      _    .    Orders     Orders   Laboratory:  BMP (Order Processing): Priority- Tomorrow AM DRAW (4 To 6 AM), Blood, 12/29/19 06:00 CST  CBC WITH AUTOMATED DIFFERENTIAL [CBCA] (Order Processing): Priority- Tomorrow AM DRAW (4 To 6 AM), Blood, 12/29/19 06:00 CST  Radiology:  XR ABDOMEN OBSTRUCTIVE SERIES 3V (Order Processing): ROUTINE, 12/28/19 15:57 CST, Portable: No, Reason: SBO, Diabetic: Unknown, No, Rad Type, TRI NS2N.    .    Assessment and Plan:       Diagnosis: will recheck the lab and x-ray to see any changes  Discussed with the Nurse in charge.   Adequate: hears normal conversation without difficulty

## 2020-01-13 ENCOUNTER — APPOINTMENT (OUTPATIENT)
Dept: OBGYN | Facility: CLINIC | Age: 35
End: 2020-01-13
Payer: COMMERCIAL

## 2020-01-13 PROCEDURE — 36415 COLL VENOUS BLD VENIPUNCTURE: CPT

## 2020-01-13 PROCEDURE — 99214 OFFICE O/P EST MOD 30 MIN: CPT

## 2020-03-16 ENCOUNTER — FORM ENCOUNTER (OUTPATIENT)
Age: 35
End: 2020-03-16

## 2020-06-19 ENCOUNTER — APPOINTMENT (OUTPATIENT)
Dept: OBGYN | Facility: CLINIC | Age: 35
End: 2020-06-19
Payer: COMMERCIAL

## 2020-06-19 PROCEDURE — 99213 OFFICE O/P EST LOW 20 MIN: CPT

## 2020-10-22 ENCOUNTER — FORM ENCOUNTER (OUTPATIENT)
Age: 35
End: 2020-10-22

## 2020-10-23 ENCOUNTER — APPOINTMENT (OUTPATIENT)
Dept: OBGYN | Facility: CLINIC | Age: 35
End: 2020-10-23
Payer: COMMERCIAL

## 2020-10-23 PROCEDURE — 99072 ADDL SUPL MATRL&STAF TM PHE: CPT

## 2020-10-23 PROCEDURE — 36415 COLL VENOUS BLD VENIPUNCTURE: CPT

## 2020-10-23 PROCEDURE — 99213 OFFICE O/P EST LOW 20 MIN: CPT

## 2020-10-29 PROBLEM — Z01.818 PRE-OPERATIVE EXAMINATION: Status: ACTIVE | Noted: 2020-10-29

## 2020-10-30 ENCOUNTER — APPOINTMENT (OUTPATIENT)
Dept: SURGERY | Facility: CLINIC | Age: 35
End: 2020-10-30
Payer: COMMERCIAL

## 2020-10-30 VITALS — HEIGHT: 68 IN | BODY MASS INDEX: 42.44 KG/M2 | WEIGHT: 280 LBS

## 2020-10-30 DIAGNOSIS — K21.9 GASTRO-ESOPHAGEAL REFLUX DISEASE W/OUT ESOPHAGITIS: ICD-10-CM

## 2020-10-30 DIAGNOSIS — Z86.32 PERSONAL HISTORY OF GESTATIONAL DIABETES: ICD-10-CM

## 2020-10-30 DIAGNOSIS — I10 ESSENTIAL (PRIMARY) HYPERTENSION: ICD-10-CM

## 2020-10-30 DIAGNOSIS — E28.2 POLYCYSTIC OVARIAN SYNDROME: ICD-10-CM

## 2020-10-30 DIAGNOSIS — E66.01 MORBID (SEVERE) OBESITY DUE TO EXCESS CALORIES: ICD-10-CM

## 2020-10-30 DIAGNOSIS — Z01.818 ENCOUNTER FOR OTHER PREPROCEDURAL EXAMINATION: ICD-10-CM

## 2020-10-30 DIAGNOSIS — M54.9 DORSALGIA, UNSPECIFIED: ICD-10-CM

## 2020-10-30 DIAGNOSIS — Z82.49 FAMILY HISTORY OF ISCHEMIC HEART DISEASE AND OTHER DISEASES OF THE CIRCULATORY SYSTEM: ICD-10-CM

## 2020-10-30 DIAGNOSIS — M19.90 UNSPECIFIED OSTEOARTHRITIS, UNSPECIFIED SITE: ICD-10-CM

## 2020-10-30 PROCEDURE — 99243 OFF/OP CNSLTJ NEW/EST LOW 30: CPT | Mod: GT

## 2020-10-30 RX ORDER — OMEPRAZOLE 20 MG/1
20 CAPSULE, DELAYED RELEASE ORAL DAILY
Qty: 30 | Refills: 3 | Status: DISCONTINUED | COMMUNITY
Start: 2018-04-10 | End: 2020-10-30

## 2020-10-30 NOTE — REASON FOR VISIT
[Initial Consult] : an initial consult for [Morbid Obesity (BMI>40)] : morbid obesity (bmi>40) [Home] : at home, [unfilled] , at the time of the visit. [Medical Office: ([facility])___] : at the medical office located in  [Verbal consent obtained from patient] : the patient, [unfilled]

## 2020-10-30 NOTE — PLAN
[FreeTextEntry1] : I have discussed my impression and treatment plan with the patient.  All surgical options were discussed including non-surgical treatments.  The patient would like to proceed with laparoscopic sleeve gastrectomy.  \par \par Benefits and risks of the planned surgery were discussed in depth, particularly leak, bleeding, sepsis, fistula, GERD, blood clots, dysphagia, malnutrition, weight regain, prolonged hospital stay and the remote possibility of death.   Also discussed was the importance of adhering to the recommended nutritional guidelines and supplements and attending regular follow-up.  I reviewed the critical importance of behavioral modification and follow-up in order to optimize outcomes and avoid complications. The patient was given the opportunity to ask pertinent questions and expressed good understanding of the aforementioned issues.\par \par Plan will be for laparoscopic sleeve gastrectomy after completion of:\par - medical weight management program\par - upper endoscopy\par - nutritional evaluation\par - medical, pulmonary and cardiac clearance\par - psychological evaluation

## 2020-10-30 NOTE — HISTORY OF PRESENT ILLNESS
[de-identified] : Ms. Lujan is a 34 year old female here for consultation for weight loss surgery.\par \par The patient has been struggling with obesity for many years.  She has attempted several diet and exercise programs without success.  She started gaining significantly more weight in the last 6 years, since moving to this country and dealing with some stressful family issues.  She eats for comfort.  The excess weight is starting to significantly affect her health and lifestyle.\par \par Medical history is significant for morbid obesity, PCOS and high blood pressure.\par Surgical history is significant for laparoscopic cholecystectomy and tonsillectomy and ovarian cystectomy.\par The patient denies prior history of blood clot or abnormal bleeding.\par The patient denies prior history of dysphagia.\par \par She is referred by Dr. Malena Boland.

## 2020-10-30 NOTE — CONSULT LETTER
[Dear  ___] : Dear  [unfilled], [Consult Letter:] : I had the pleasure of evaluating your patient, [unfilled]. [Please see my note below.] : Please see my note below. [Consult Closing:] : Thank you very much for allowing me to participate in the care of this patient.  If you have any questions, please do not hesitate to contact me. [Sincerely,] : Sincerely, [FreeTextEntry2] : Dr. Malena Boland [FreeTextEntry3] : Ever Gomez MD, FACS, FASMBS\par [facility]\par [address]\[place] [address]\par tel. [phone]\par fax [phone]\par  \par

## 2020-10-30 NOTE — ASSESSMENT
[FreeTextEntry1] : 34-year-old female with a history of morbid obesity (height 5 feet 8 inches, weight 280 pounds, BMI 42.5) and comorbidities of PCOS and hypertension presenting for evaluation for weight loss surgery.\par \par The patient has failed multiple prior attempts at weight loss and is now dealing with the side effects, risk factors, and poor quality of life associated with morbid obesity.  They would benefit from surgical weight loss as outlined in the NIH and  ASMBS consensus statements on bariatric surgery.  Surgical intervention is medically indicated.\par \par My impression is that the patient is a reasonable candidate for laparoscopic sleeve gastrectomy.\par

## 2020-11-05 ENCOUNTER — APPOINTMENT (OUTPATIENT)
Dept: ULTRASOUND IMAGING | Facility: CLINIC | Age: 35
End: 2020-11-05
Payer: COMMERCIAL

## 2020-11-05 ENCOUNTER — FORM ENCOUNTER (OUTPATIENT)
Age: 35
End: 2020-11-05

## 2020-11-05 ENCOUNTER — OUTPATIENT (OUTPATIENT)
Dept: OUTPATIENT SERVICES | Facility: HOSPITAL | Age: 35
LOS: 1 days | End: 2020-11-05
Payer: COMMERCIAL

## 2020-11-05 ENCOUNTER — RESULT REVIEW (OUTPATIENT)
Age: 35
End: 2020-11-05

## 2020-11-05 DIAGNOSIS — N83.209 UNSPECIFIED OVARIAN CYST, UNSPECIFIED SIDE: Chronic | ICD-10-CM

## 2020-11-05 DIAGNOSIS — Z90.89 ACQUIRED ABSENCE OF OTHER ORGANS: Chronic | ICD-10-CM

## 2020-11-05 DIAGNOSIS — Z90.49 ACQUIRED ABSENCE OF OTHER SPECIFIED PARTS OF DIGESTIVE TRACT: Chronic | ICD-10-CM

## 2020-11-05 DIAGNOSIS — Z00.8 ENCOUNTER FOR OTHER GENERAL EXAMINATION: ICD-10-CM

## 2020-11-05 PROCEDURE — 76856 US EXAM PELVIC COMPLETE: CPT

## 2020-11-05 PROCEDURE — 76830 TRANSVAGINAL US NON-OB: CPT | Mod: 26

## 2020-11-05 PROCEDURE — 76856 US EXAM PELVIC COMPLETE: CPT | Mod: 26

## 2020-11-05 PROCEDURE — 76830 TRANSVAGINAL US NON-OB: CPT

## 2020-11-09 ENCOUNTER — FORM ENCOUNTER (OUTPATIENT)
Age: 35
End: 2020-11-09

## 2021-10-06 PROBLEM — I10 ESSENTIAL HYPERTENSION: Status: ACTIVE | Noted: 2020-10-30

## 2021-11-05 ENCOUNTER — EMERGENCY (EMERGENCY)
Facility: HOSPITAL | Age: 36
LOS: 1 days | Discharge: HOME | End: 2021-11-05
Attending: EMERGENCY MEDICINE | Admitting: EMERGENCY MEDICINE
Payer: COMMERCIAL

## 2021-11-05 VITALS
SYSTOLIC BLOOD PRESSURE: 134 MMHG | RESPIRATION RATE: 16 BRPM | OXYGEN SATURATION: 100 % | HEART RATE: 71 BPM | TEMPERATURE: 98 F | HEIGHT: 68 IN | DIASTOLIC BLOOD PRESSURE: 71 MMHG | WEIGHT: 214.95 LBS

## 2021-11-05 VITALS
SYSTOLIC BLOOD PRESSURE: 119 MMHG | OXYGEN SATURATION: 97 % | RESPIRATION RATE: 18 BRPM | HEART RATE: 60 BPM | DIASTOLIC BLOOD PRESSURE: 72 MMHG

## 2021-11-05 DIAGNOSIS — Z90.3 ACQUIRED ABSENCE OF STOMACH [PART OF]: ICD-10-CM

## 2021-11-05 DIAGNOSIS — R10.13 EPIGASTRIC PAIN: ICD-10-CM

## 2021-11-05 DIAGNOSIS — N83.201 UNSPECIFIED OVARIAN CYST, RIGHT SIDE: ICD-10-CM

## 2021-11-05 DIAGNOSIS — Z90.3 ACQUIRED ABSENCE OF STOMACH [PART OF]: Chronic | ICD-10-CM

## 2021-11-05 DIAGNOSIS — Z90.49 ACQUIRED ABSENCE OF OTHER SPECIFIED PARTS OF DIGESTIVE TRACT: ICD-10-CM

## 2021-11-05 DIAGNOSIS — Z90.89 ACQUIRED ABSENCE OF OTHER ORGANS: Chronic | ICD-10-CM

## 2021-11-05 DIAGNOSIS — Z20.822 CONTACT WITH AND (SUSPECTED) EXPOSURE TO COVID-19: ICD-10-CM

## 2021-11-05 DIAGNOSIS — Z90.49 ACQUIRED ABSENCE OF OTHER SPECIFIED PARTS OF DIGESTIVE TRACT: Chronic | ICD-10-CM

## 2021-11-05 DIAGNOSIS — Z87.19 PERSONAL HISTORY OF OTHER DISEASES OF THE DIGESTIVE SYSTEM: ICD-10-CM

## 2021-11-05 DIAGNOSIS — N83.209 UNSPECIFIED OVARIAN CYST, UNSPECIFIED SIDE: Chronic | ICD-10-CM

## 2021-11-05 DIAGNOSIS — Z87.42 PERSONAL HISTORY OF OTHER DISEASES OF THE FEMALE GENITAL TRACT: ICD-10-CM

## 2021-11-05 DIAGNOSIS — E66.9 OBESITY, UNSPECIFIED: ICD-10-CM

## 2021-11-05 LAB
ALBUMIN SERPL ELPH-MCNC: 4.1 G/DL — SIGNIFICANT CHANGE UP (ref 3.3–5)
ALP SERPL-CCNC: 68 U/L — SIGNIFICANT CHANGE UP (ref 40–120)
ALT FLD-CCNC: 21 U/L — SIGNIFICANT CHANGE UP (ref 10–45)
ANION GAP SERPL CALC-SCNC: 10 MMOL/L — SIGNIFICANT CHANGE UP (ref 5–17)
APPEARANCE UR: CLEAR — SIGNIFICANT CHANGE UP
APTT BLD: 34.5 SEC — SIGNIFICANT CHANGE UP (ref 27.5–35.5)
AST SERPL-CCNC: 35 U/L — SIGNIFICANT CHANGE UP (ref 10–40)
BASOPHILS # BLD AUTO: 0.03 K/UL — SIGNIFICANT CHANGE UP (ref 0–0.2)
BASOPHILS NFR BLD AUTO: 0.3 % — SIGNIFICANT CHANGE UP (ref 0–2)
BILIRUB SERPL-MCNC: 0.4 MG/DL — SIGNIFICANT CHANGE UP (ref 0.2–1.2)
BILIRUB UR-MCNC: NEGATIVE — SIGNIFICANT CHANGE UP
BUN SERPL-MCNC: 8 MG/DL — SIGNIFICANT CHANGE UP (ref 7–23)
CALCIUM SERPL-MCNC: 9.4 MG/DL — SIGNIFICANT CHANGE UP (ref 8.4–10.5)
CHLORIDE SERPL-SCNC: 106 MMOL/L — SIGNIFICANT CHANGE UP (ref 96–108)
CO2 SERPL-SCNC: 23 MMOL/L — SIGNIFICANT CHANGE UP (ref 22–31)
COLOR SPEC: YELLOW — SIGNIFICANT CHANGE UP
CREAT SERPL-MCNC: 0.59 MG/DL — SIGNIFICANT CHANGE UP (ref 0.5–1.3)
DIFF PNL FLD: NEGATIVE — SIGNIFICANT CHANGE UP
EOSINOPHIL # BLD AUTO: 0.08 K/UL — SIGNIFICANT CHANGE UP (ref 0–0.5)
EOSINOPHIL NFR BLD AUTO: 0.9 % — SIGNIFICANT CHANGE UP (ref 0–6)
GLUCOSE SERPL-MCNC: 83 MG/DL — SIGNIFICANT CHANGE UP (ref 70–99)
GLUCOSE UR QL: NEGATIVE — SIGNIFICANT CHANGE UP
HCG SERPL-ACNC: <0 MIU/ML — SIGNIFICANT CHANGE UP
HCT VFR BLD CALC: 40.6 % — SIGNIFICANT CHANGE UP (ref 34.5–45)
HGB BLD-MCNC: 13.7 G/DL — SIGNIFICANT CHANGE UP (ref 11.5–15.5)
IMM GRANULOCYTES NFR BLD AUTO: 0.2 % — SIGNIFICANT CHANGE UP (ref 0–1.5)
INR BLD: 1.08 — SIGNIFICANT CHANGE UP (ref 0.88–1.16)
KETONES UR-MCNC: ABNORMAL MG/DL
LEUKOCYTE ESTERASE UR-ACNC: NEGATIVE — SIGNIFICANT CHANGE UP
LIDOCAIN IGE QN: 42 U/L — SIGNIFICANT CHANGE UP (ref 7–60)
LYMPHOCYTES # BLD AUTO: 2.68 K/UL — SIGNIFICANT CHANGE UP (ref 1–3.3)
LYMPHOCYTES # BLD AUTO: 28.6 % — SIGNIFICANT CHANGE UP (ref 13–44)
MCHC RBC-ENTMCNC: 30.1 PG — SIGNIFICANT CHANGE UP (ref 27–34)
MCHC RBC-ENTMCNC: 33.7 GM/DL — SIGNIFICANT CHANGE UP (ref 32–36)
MCV RBC AUTO: 89.2 FL — SIGNIFICANT CHANGE UP (ref 80–100)
MONOCYTES # BLD AUTO: 0.54 K/UL — SIGNIFICANT CHANGE UP (ref 0–0.9)
MONOCYTES NFR BLD AUTO: 5.8 % — SIGNIFICANT CHANGE UP (ref 2–14)
NEUTROPHILS # BLD AUTO: 6.01 K/UL — SIGNIFICANT CHANGE UP (ref 1.8–7.4)
NEUTROPHILS NFR BLD AUTO: 64.2 % — SIGNIFICANT CHANGE UP (ref 43–77)
NITRITE UR-MCNC: NEGATIVE — SIGNIFICANT CHANGE UP
NRBC # BLD: 0 /100 WBCS — SIGNIFICANT CHANGE UP (ref 0–0)
PH UR: 6.5 — SIGNIFICANT CHANGE UP (ref 5–8)
PLATELET # BLD AUTO: 286 K/UL — SIGNIFICANT CHANGE UP (ref 150–400)
POTASSIUM SERPL-MCNC: 3.9 MMOL/L — SIGNIFICANT CHANGE UP (ref 3.5–5.3)
POTASSIUM SERPL-SCNC: 3.9 MMOL/L — SIGNIFICANT CHANGE UP (ref 3.5–5.3)
PROT SERPL-MCNC: 7.3 G/DL — SIGNIFICANT CHANGE UP (ref 6–8.3)
PROT UR-MCNC: NEGATIVE MG/DL — SIGNIFICANT CHANGE UP
PROTHROM AB SERPL-ACNC: 12.9 SEC — SIGNIFICANT CHANGE UP (ref 10.6–13.6)
RBC # BLD: 4.55 M/UL — SIGNIFICANT CHANGE UP (ref 3.8–5.2)
RBC # FLD: 12.9 % — SIGNIFICANT CHANGE UP (ref 10.3–14.5)
SARS-COV-2 RNA SPEC QL NAA+PROBE: NEGATIVE — SIGNIFICANT CHANGE UP
SODIUM SERPL-SCNC: 139 MMOL/L — SIGNIFICANT CHANGE UP (ref 135–145)
SP GR SPEC: 1.02 — SIGNIFICANT CHANGE UP (ref 1–1.03)
TROPONIN T SERPL-MCNC: 0.01 NG/ML — SIGNIFICANT CHANGE UP (ref 0–0.01)
UROBILINOGEN FLD QL: 2 E.U./DL
WBC # BLD: 9.36 K/UL — SIGNIFICANT CHANGE UP (ref 3.8–10.5)
WBC # FLD AUTO: 9.36 K/UL — SIGNIFICANT CHANGE UP (ref 3.8–10.5)

## 2021-11-05 PROCEDURE — 82962 GLUCOSE BLOOD TEST: CPT

## 2021-11-05 PROCEDURE — 83690 ASSAY OF LIPASE: CPT

## 2021-11-05 PROCEDURE — 71045 X-RAY EXAM CHEST 1 VIEW: CPT

## 2021-11-05 PROCEDURE — 76830 TRANSVAGINAL US NON-OB: CPT

## 2021-11-05 PROCEDURE — 84484 ASSAY OF TROPONIN QUANT: CPT

## 2021-11-05 PROCEDURE — 85730 THROMBOPLASTIN TIME PARTIAL: CPT

## 2021-11-05 PROCEDURE — 87635 SARS-COV-2 COVID-19 AMP PRB: CPT

## 2021-11-05 PROCEDURE — 99285 EMERGENCY DEPT VISIT HI MDM: CPT

## 2021-11-05 PROCEDURE — 74177 CT ABD & PELVIS W/CONTRAST: CPT | Mod: 26,MA

## 2021-11-05 PROCEDURE — 81003 URINALYSIS AUTO W/O SCOPE: CPT

## 2021-11-05 PROCEDURE — 99284 EMERGENCY DEPT VISIT MOD MDM: CPT | Mod: 25

## 2021-11-05 PROCEDURE — 84702 CHORIONIC GONADOTROPIN TEST: CPT

## 2021-11-05 PROCEDURE — 80053 COMPREHEN METABOLIC PANEL: CPT

## 2021-11-05 PROCEDURE — 76856 US EXAM PELVIC COMPLETE: CPT | Mod: 26

## 2021-11-05 PROCEDURE — 87086 URINE CULTURE/COLONY COUNT: CPT

## 2021-11-05 PROCEDURE — 36415 COLL VENOUS BLD VENIPUNCTURE: CPT

## 2021-11-05 PROCEDURE — 74177 CT ABD & PELVIS W/CONTRAST: CPT | Mod: MA

## 2021-11-05 PROCEDURE — 93005 ELECTROCARDIOGRAM TRACING: CPT

## 2021-11-05 PROCEDURE — 71045 X-RAY EXAM CHEST 1 VIEW: CPT | Mod: 26

## 2021-11-05 PROCEDURE — 85610 PROTHROMBIN TIME: CPT

## 2021-11-05 PROCEDURE — 85025 COMPLETE CBC W/AUTO DIFF WBC: CPT

## 2021-11-05 PROCEDURE — 76856 US EXAM PELVIC COMPLETE: CPT

## 2021-11-05 PROCEDURE — 76830 TRANSVAGINAL US NON-OB: CPT | Mod: 26

## 2021-11-05 RX ORDER — IOHEXOL 300 MG/ML
30 INJECTION, SOLUTION INTRAVENOUS ONCE
Refills: 0 | Status: COMPLETED | OUTPATIENT
Start: 2021-11-05 | End: 2021-11-05

## 2021-11-05 RX ORDER — SODIUM CHLORIDE 9 MG/ML
1000 INJECTION INTRAMUSCULAR; INTRAVENOUS; SUBCUTANEOUS ONCE
Refills: 0 | Status: COMPLETED | OUTPATIENT
Start: 2021-11-05 | End: 2021-11-05

## 2021-11-05 RX ADMIN — SODIUM CHLORIDE 1000 MILLILITER(S): 9 INJECTION INTRAMUSCULAR; INTRAVENOUS; SUBCUTANEOUS at 14:32

## 2021-11-05 RX ADMIN — IOHEXOL 30 MILLILITER(S): 300 INJECTION, SOLUTION INTRAVENOUS at 14:51

## 2021-11-05 NOTE — ED PROVIDER NOTE - CARE PLAN
1 Principal Discharge DX:	Epigastric pain   Principal Discharge DX:	Epigastric pain  Secondary Diagnosis:	Hemorrhagic ovarian cyst

## 2021-11-05 NOTE — ED PROVIDER NOTE - CARE PROVIDER_API CALL
Louis Cunha)  Medicine  [address], [address]  [place] [address]  Phone: [phone]  Fax: [phone]  Follow Up Time:     Yobani Lazcano  OBSTETRICS AND GYNECOLOGY  [address] [place] [address]  Phone: [phone]  Fax: [phone]  Follow Up Time:

## 2021-11-05 NOTE — ED ADULT TRIAGE NOTE - CHIEF COMPLAINT QUOTE
Pt reports episode of abd pain, chest pain today, was found at work in the bathroom pale. No LOC. Pt reports pain has resolved, normal color at this time.  Denies any symptoms at this time, no n/v.

## 2021-11-05 NOTE — ED ADULT NURSE NOTE - NSICDXPASTMEDICALHX_GEN_ALL_CORE_FT
PAST MEDICAL HISTORY:  Gallstones '17     (normal spontaneous vaginal delivery) '16    Obesity (BMI 30-39.9)     Ovarian cyst age 18  Right

## 2021-11-05 NOTE — ED ADULT NURSE NOTE - NSICDXPASTSURGICALHX_GEN_ALL_CORE_FT
PAST SURGICAL HISTORY:  H/O gastric sleeve     History of tonsillectomy childhood    Ovarian cyst ' 19 Removal of cyst: Right    S/P laparoscopic cholecystectomy 2017

## 2021-11-05 NOTE — ED PROVIDER NOTE - PHYSICAL EXAMINATION
VITAL SIGNS: I have reviewed nursing notes and confirm.  CONSTITUTIONAL: Non toxic; in no acute distress.   SKIN:  Warm and dry, no acute rash.   HEAD:  Normocephalic, atraumatic.  EYES: PERRL.  EOM intact; conjunctiva and sclera clear.  ENT: No nasal discharge; airway clear.   NECK: Supple; non tender.  CARD: S1, S2 normal; no murmurs, gallops, or rubs. Regular rate and rhythm.   RESP:  Clear to auscultation b/l, no wheezes, rales or rhonchi.  ABD: Normal bowel sounds; soft; non-distended; non-tender; no guarding/rebound.  EXT: Normal ROM. No clubbing, cyanosis or edema. 2+ pulses to b/l ue/le.  NEURO: Alert, oriented, grossly unremarkable.  5/5 strength x 4 extremities against gravity and external force.  No drift x 4 extremities.  Sensation intact and symmetric x 4 extremities.  No facial asymmetry.    PSYCH: Cooperative, mood and affect appropriate. VITAL SIGNS: I have reviewed nursing notes and confirm.  CONSTITUTIONAL: Non toxic; in no acute distress.   SKIN:  Warm and dry, no acute rash.   HEAD:  Normocephalic, atraumatic.  EYES: PERRL.  EOM intact; conjunctiva and sclera clear.  ENT: No nasal discharge; airway clear.   NECK: Supple; non tender.  CARD: S1, S2 normal; no murmurs, gallops, or rubs. Regular rate and rhythm.   RESP:  Clear to auscultation b/l, no wheezes, rales or rhonchi.  ABD: Normal bowel sounds; soft; non-distended; non-tender/no reproducible pain on palpation throughout; no guarding/rebound.  EXT: Normal ROM. No clubbing, cyanosis or edema. 2+ pulses to b/l ue/le.  NEURO: Alert, oriented, grossly unremarkable.  5/5 strength x 4 extremities against gravity and external force.  No drift x 4 extremities.  Sensation intact and symmetric x 4 extremities.  No facial asymmetry.    PSYCH: Cooperative, mood and affect appropriate.

## 2021-11-05 NOTE — ED PROVIDER NOTE - CLINICAL SUMMARY MEDICAL DECISION MAKING FREE TEXT BOX
Patient presents to ED with epigastric/abd pain today.  Non toxic, in NAD.  Labs, imaging ordered. Patient presents to ED with epigastric/abd pain today.  Non toxic, in NAD.  Labs, imaging ordered.  Patient completely pain free at this time.  IVF bolus given. Patient presents to ED with epigastric/abd pain today.  Non toxic, in NAD.  Labs, imaging ordered.  Patient completely pain free at this time.  IVF bolus given.  CT results reviewed and enlarged ovary noted with concern for cyst.  US ordered and hemorrhagic cyst noted without evidence of torsion, bilaterally.  Patient without any pain at this time.  She and  at bedside aware of all results as well as recommendation for prompt outpatient follow up.  She is also provided GI and gyn follow up information.  Patient is advised to follow up with gyn team, GI team, as well as their PCP in 1-2 days without fail.  Patient instructed to return to ED immediately should their symptoms worsen or if there is any concern prior to the recommended PCP follow up.  Patient is aware of plan and verbalizes their understanding.  Will discharge home at this time.

## 2021-11-05 NOTE — ED PROVIDER NOTE - OBJECTIVE STATEMENT
35 year old female presents to ED with concern for abdominal discomfort 35 year old female with history of gastric sleeve (June 2021), cholecystectomy presents to ED with concern for abdominal discomfort earlier today that is now completely dissipated.  Patient states she gets waves of similar symptoms every several months, which began prior to gastric sleeve procedure.  She notes pressure sensation that begins to mid abdomen/epigastric area and moves upwards.  She has had prior imaging and EGDs, all negative per patient.  Patient denies associated fever, chills, headache, visual changes, chest pain, shortness of breath, nausea, emesis, changes to bowel movements, peripheral edema, calf pain/tenderness, recent travel, known sick contacts or any additional acute complaints or concerns at this time.

## 2021-11-05 NOTE — ED ADULT NURSE NOTE - OBJECTIVE STATEMENT
A&OX4. AMBULATORY TO RESTROOM WITH STEADY GAIT. BREATHING EVEN AND UNLABORED. SPEAKING IN CLEAR AND COMPLETE SENTENCES.

## 2021-11-05 NOTE — ED PROVIDER NOTE - PATIENT PORTAL LINK FT
You can access the FollowMyHealth Patient Portal offered by [facility] by registering at the following website: [email or web address]. By joining [employer]’s FollowMyHealth portal, you will also be able to view your health information using other applications (apps) compatible with our system.

## 2021-11-05 NOTE — ED PROVIDER NOTE - NSFOLLOWUPINSTRUCTIONS_ED_ALL_ED_FT
Please follow up with gastroenterologist, Dr. Cunha (information provided to you), gynecologist, Dr. Lazcano (information provided to you) as well as your primary physician in 1-2 days for re evaluation.  Please return to ER immediately should your symptoms worsen or if you have any concern prior to this recommended follow up.          Epigastric Pain    WHAT YOU NEED TO KNOW:    Epigastric pain is felt in the middle of the upper abdomen, between the ribs and the bellybutton. The pain may be mild or severe. Pain may spread from or to another part of your body. Epigastric pain may be a sign of a serious health problem that needs to be treated.     DISCHARGE INSTRUCTIONS:    Call 911 for any of the following:   •You have any of the following signs of a heart attack: ?Squeezing, pressure, or pain in your chest      ?You may also have any of the following: ?Discomfort or pain in your back, neck, jaw, stomach, or arm      ?Shortness of breath      ?Nausea or vomiting      ?Lightheadedness or a sudden cold sweat        •You have severe pain that radiates to your jaw or back.      Return to the emergency department if:   •You have severe pain that starts suddenly and quickly gets worse.      •You cannot have a bowel movement and are vomiting.      •You vomit or cough up blood.      •You see blood in your urine or bowel movement.      •You feel drowsy and your breathing is slower than usual.      Contact your healthcare provider if:   •You have a fever or chills.      •You have yellowing of your skin or the whites of your eyes.      •You vomit often or several times in a row.       •You lose weight without trying.      •You have symptoms for longer than 2 weeks.      •You have questions or concerns about your condition or care.      Medicines:   •Medicines may be given to treat pain or stop vomiting. You may also need medicines to reduce or control stomach acid, or treat an infection.      •Take your medicine as directed. Contact your healthcare provider if you think your medicine is not helping or if you have side effects. Tell him of her if you are allergic to any medicine. Keep a list of the medicines, vitamins, and herbs you take. Include the amounts, and when and why you take them. Bring the list or the pill bottles to follow-up visits. Carry your medicine list with you in case of an emergency.      Follow up with your healthcare provider as directed: Write down your questions so you remember to ask them during your visits.     Manage your symptoms:   •Keep a record of your symptoms. Include when the pain starts, how long it lasts, and if it is sharp or dull. Also include any foods you ate or activities you did before the pain started. Keep track of anything that helped the pain.       •Eat a variety of healthy foods. Healthy foods include fruits, vegetables, whole-grain breads, low-fat dairy products, beans, lean meats, and fish. Ask if you need to be on a special diet. Certain foods may cause your pain, such as alcohol or foods that are high in fat. You may need to eat smaller meals and to eat more often than usual.      •Drink liquids as directed. Ask how much liquid to drink each day and which liquids are best for you. Do not have drinks that contain alcohol or caffeine.             Ovarian Cyst    WHAT YOU NEED TO KNOW:    An ovarian cyst is a fluid-filled sac that grows in or on an ovary. You have 2 ovaries, 1 on each side of your uterus. They are small, about the shape of an almond. Ovarian cysts are common in women who have regular monthly cycles. During your monthly cycle, eggs are released from the ovaries. The cyst usually contains fluid but may sometimes have blood or tissue in it. Most ovarian cysts are harmless and go away without treatment in a few months. Some cysts can grow large, cause pain, or break open.     Female Reproductive System         DISCHARGE INSTRUCTIONS:    Call your local emergency number (911 in the [place]) if:   •You have severe pain with fever and vomiting.      •You have sudden, severe abdominal pain.      •You are too weak, faint, or dizzy to stand up.      •You are breathing very quickly.      Call your doctor or gynecologist if:   •Your periods are early, late, or more painful than usual.      •You have questions or concerns about your condition or care.      Medicines: You may need any of the following:   •Birth control pills may help control your monthly cycle, prevent cysts, or cause them to shrink.      •Acetaminophen decreases pain and fever. It is available without a doctor's order. Ask how much to take and how often to take it. Follow directions. Read the labels of all other medicines you are using to see if they also contain acetaminophen, or ask your doctor or pharmacist. Acetaminophen can cause liver damage if not taken correctly. Do not use more than 4 grams (4,000 milligrams) total of acetaminophen in one day.       •NSAIDs, such as ibuprofen, help decrease swelling, pain, and fever. This medicine is available with or without a doctor's order. NSAIDs can cause stomach bleeding or kidney problems in certain people. If you take blood thinner medicine, always ask your healthcare provider if NSAIDs are safe for you. Always read the medicine label and follow directions.      •Prescription pain medicine may be given. Ask your healthcare provider how to take this medicine safely. Some prescription pain medicines contain acetaminophen. Do not take other medicines that contain acetaminophen without talking to your healthcare provider. Too much acetaminophen may cause liver damage. Prescription pain medicine may cause constipation. Ask your healthcare provider how to prevent or treat constipation.       •Take your medicine as directed. Contact your healthcare provider if you think your medicine is not helping or if you have side effects. Tell him or her if you are allergic to any medicine. Keep a list of the medicines, vitamins, and herbs you take. Include the amounts, and when and why you take them. Bring the list or the pill bottles to follow-up visits. Carry your medicine list with you in case of an emergency.      Manage ovarian cysts: You can manage a current cyst and help healthcare providers find future cysts early.  •Apply heat to decrease pain and cramping from a cyst. Sit in a warm bath, or place a heating pad (turned on low) on your abdomen. Do this for 15 to 20 minutes every hour for comfort.      •Get regular pelvic exams or Pap smears. This will help providers find any new ovarian cysts. Tell your healthcare provider about any unusual changes in your monthly cycle.      Follow up with your doctor or gynecologist as directed: Write down your questions so you remember to ask them during your visits.       © Copyright [employer] 2021           back to top                          © Copyright [employer] 2021

## 2021-11-05 NOTE — ED PROVIDER NOTE - CARE PROVIDERS DIRECT ADDRESSES
[email or web address]

## 2021-11-06 LAB
CULTURE RESULTS: NO GROWTH — SIGNIFICANT CHANGE UP
SPECIMEN SOURCE: SIGNIFICANT CHANGE UP

## 2022-01-08 ENCOUNTER — NON-APPOINTMENT (OUTPATIENT)
Age: 37
End: 2022-01-08

## 2022-01-12 ENCOUNTER — APPOINTMENT (OUTPATIENT)
Dept: OBGYN | Facility: CLINIC | Age: 37
End: 2022-01-12
Payer: COMMERCIAL

## 2022-01-12 VITALS
BODY MASS INDEX: 31.22 KG/M2 | WEIGHT: 206 LBS | DIASTOLIC BLOOD PRESSURE: 76 MMHG | HEIGHT: 68 IN | SYSTOLIC BLOOD PRESSURE: 122 MMHG

## 2022-01-12 PROCEDURE — 99395 PREV VISIT EST AGE 18-39: CPT

## 2022-01-12 NOTE — END OF VISIT
[FreeTextEntry3] : I, Kermit Geshalini acted as a scribe on behalf of Dr. Man on 01/12/2022 \par \par All medical entries made by the scribe were at my, Dr. Man, direction and personally dictated by me on 01/12/2022. I have reviewed the chart and agree that the record accurately reflects my personal performance of the history, physical exam, assessment and plan. I have also personally directed, reviewed, and agreed with the chart.\par

## 2022-01-12 NOTE — PLAN
[FreeTextEntry1] : 36 year old here for annual\par -pap/hpv\par -BSE, lubricant use\par -to start PNV\par -discussed preconception counseling, declining fertility referral \par July Man MD \par

## 2022-01-12 NOTE — HISTORY OF PRESENT ILLNESS
[FreeTextEntry1] : 35 y/o  presents for annual visit. Pt has no issue or concerns. Had bariatric surgery in [place] last year and has lost close to 100lbs. States since then her periods have been regular. Has a hx of miscarriages in the past, declining fertility referral. Going to try to get pregnant this year. Denies any other issues\par \par PMHx morbid obesity, PCOS, High blood pressure\par PSHx: laparoscopic cholecystectomy and tonsillectomy, ovarian cystectomy [LMPDate] : 1/6/22

## 2022-01-12 NOTE — COUNSELING
[Preconception Care/ Fertility options] : preconception care, fertility options [Nutrition/ Exercise/ Weight Management] : nutrition, exercise, weight management [Breast Self Exam] : breast self exam

## 2022-01-13 LAB — HPV HIGH+LOW RISK DNA PNL CVX: NOT DETECTED

## 2022-01-17 LAB — CYTOLOGY CVX/VAG DOC THIN PREP: NORMAL

## 2022-04-20 NOTE — H&P PST ADULT - NS PRO FEM REPRO HEALTH SCREEN
I reviewed all of patient's labs which consisted of a CBC with diff, hemoglobin A1c, CMP, PSA, and fasting lipid panel  His total cholesterol was 147  Triglycerides were 96  HDL cholesterol 65  LDL cholesterol is 63  His goal is less than 70   He will continue atorvastatin 40 mg daily
Lab Results   Component Value Date    EGFR 53 04/18/2022    EGFR 36 03/01/2021    EGFR 60 01/09/2020    CREATININE 1 34 (H) 04/18/2022    CREATININE 1 85 (H) 03/01/2021    CREATININE 1 24 01/09/2020   His creatinine was noted to be 1 34 with an estimated GFR of 53  Patient needs to avoid nephrotoxic agents    He may continue metformin at his current dose
Lab Results   Component Value Date    HGBA1C 7 4 (H) 04/18/2022   Patient has type 2 diabetes mellitus  I reviewed his labs  His fasting blood glucose was 144  Hemoglobin A1c is 7 4  Previously, he was 9 3  Since I last saw the patient, he was started on Ozempic and his Amaryl was discontinued  Unfortunately, despite the fact that he started Ozempic, he gained 7 lb  Patient needs to lose weight  I suggested we discontinue his Actos, which may help him lose weight  He was started on Jardiance 10 mg once a day  This should be titrated up to 25 mg daily, depending on his response  While in [place], he did see Ophthalmology and I will try to get a copy of his dilated eye exam   He had cataract surgery while out in [place]  Today, we discussed diabetic foot care  I also told the patient he needs closer follow-up  He needs to notify me when he is in [place] so that we can attend to the patient's needs 
Patient has hypertension  Blood pressure is well controlled  I recheck his blood pressure myself and found it to be 120/66  He is off lisinopril, likely due to the bump in his creatinine  He is now on amlodipine 5 mg daily  He tells me he feels much better on this medication 
Patient is 81years old and has never had a colonoscopy  We discussed colon cancer screening  The patient was agreeable to a fit test and this was ordered 
breast self-exam

## 2022-05-04 ENCOUNTER — NON-APPOINTMENT (OUTPATIENT)
Age: 37
End: 2022-05-04

## 2022-05-04 ENCOUNTER — APPOINTMENT (OUTPATIENT)
Dept: CARDIOLOGY | Facility: CLINIC | Age: 37
End: 2022-05-04
Payer: COMMERCIAL

## 2022-05-04 VITALS
BODY MASS INDEX: 28.89 KG/M2 | TEMPERATURE: 97.6 F | RESPIRATION RATE: 16 BRPM | WEIGHT: 190 LBS | SYSTOLIC BLOOD PRESSURE: 109 MMHG | OXYGEN SATURATION: 99 % | DIASTOLIC BLOOD PRESSURE: 74 MMHG | HEART RATE: 89 BPM

## 2022-05-04 DIAGNOSIS — R55 SYNCOPE AND COLLAPSE: ICD-10-CM

## 2022-05-04 DIAGNOSIS — Z87.19 PERSONAL HISTORY OF OTHER DISEASES OF THE DIGESTIVE SYSTEM: ICD-10-CM

## 2022-05-04 PROCEDURE — 93000 ELECTROCARDIOGRAM COMPLETE: CPT

## 2022-05-04 PROCEDURE — 99203 OFFICE O/P NEW LOW 30 MIN: CPT

## 2022-05-04 RX ORDER — PROGESTERONE 200 MG/1
CAPSULE ORAL
Refills: 0 | Status: DISCONTINUED | COMMUNITY
End: 2022-05-04

## 2022-05-08 PROBLEM — R55 NEAR SYNCOPE: Status: ACTIVE | Noted: 2022-05-08

## 2022-05-08 PROBLEM — Z87.19 HISTORY OF ACUTE GASTRITIS: Status: RESOLVED | Noted: 2018-04-10 | Resolved: 2022-05-08

## 2022-05-08 NOTE — HISTORY OF PRESENT ILLNESS
[FreeTextEntry1] : Since last pregnancy 7 years ago she has had occasional episodes of feeling epigastric discomfort followed by chest discomfort and feeling of passing out. Those who witnessed the episodes says that she also looks pale at the time of the event. Denies syncope. Denies shortness of breath or palpitations. She had gallbladder removed which was thought to be the issue but she continues to experience these episodes after surgery.

## 2022-05-08 NOTE — DISCUSSION/SUMMARY
[FreeTextEntry1] : \par Currently stable from a cardiovascular standpoint. Normotensive (low normal). Appears euvolemic. Unclear etiology of episodes of epigastric/chest discomfort followed by near syncope. Consider vasovagal etiology. ECG completed today and reviewed. Discussed preventative and precautionary measures in managing potential vasovagal episodes. Advised patient to maintain adequate hydration. Also advised patient to measure her blood pressure and heart rate if possible at time of the episode. Will obtain an echocardiogram to assess her cardiac structures and function. Pending test result, I will make further recommendations.

## 2022-05-08 NOTE — REVIEW OF SYSTEMS
[SOB] : no shortness of breath [Dyspnea on exertion] : not dyspnea during exertion [Chest Discomfort] : chest discomfort [Lower Ext Edema] : no extremity edema [Leg Claudication] : no intermittent leg claudication [Palpitations] : no palpitations [Orthopnea] : no orthopnea [PND] : no PND [Syncope] : no syncope [Abdominal Pain] : abdominal pain [Vomiting] : no vomiting [Heartburn] : no heartburn [Change in Appetite] : no change in appetite [Change In The Stool] : no change in stool [Dysphagia] : no dysphagia [Diarrhea] : diarrhea [Constipation] : no constipation [Blood in Stool] : no blood in stool [Negative] : Musculoskeletal [de-identified] : see HPI

## 2022-05-11 ENCOUNTER — APPOINTMENT (OUTPATIENT)
Dept: OBGYN | Facility: CLINIC | Age: 37
End: 2022-05-11
Payer: COMMERCIAL

## 2022-05-11 DIAGNOSIS — N97.9 FEMALE INFERTILITY, UNSPECIFIED: ICD-10-CM

## 2022-05-11 PROCEDURE — 99213 OFFICE O/P EST LOW 20 MIN: CPT | Mod: 95

## 2022-05-11 NOTE — HISTORY OF PRESENT ILLNESS
[Home] : at home, [unfilled] , at the time of the visit. [Verbal consent obtained from patient] : the patient, [unfilled] [FreeTextEntry1] : Pt spoken to via telehealth today. \par  states she has been trying to get pregnant for 6 months. Her prior pregnancy was conceived after 1 clomid cycle and states she desires to try clomid again. Declining [facility] referral. States she gets regular periods. Was told she has PCOS in the past due to irregular periods which have now improved after dramatic weight loss s/p baraitic surgery. US in past does not show polycystic ovaries.

## 2022-05-11 NOTE — PLAN
[FreeTextEntry1] : \par Pt to be started on Clomid days 3-7  Pt to have timed intercourse as recommended. Risks of Clomid reviewed including mutliple births, ovarian cysts, headaches, and hormonal symptoms. Rx sent. Understands after 2 failed cycles will rec pt goes to [facility]. Discussed that with PCOS dx letrozole would be first line, declines.\par July Man MD

## 2022-06-13 NOTE — H&P PST ADULT - PRO ANTICIPATED DISCH DISP
[No Acute Distress] : no acute distress [Well Nourished] : well nourished [Well Developed] : well developed [Well-Appearing] : well-appearing [Normal Sclera/Conjunctiva] : normal sclera/conjunctiva [PERRL] : pupils equal round and reactive to light [EOMI] : extraocular movements intact [Normal Outer Ear/Nose] : the outer ears and nose were normal in appearance [Normal Oropharynx] : the oropharynx was normal [No JVD] : no jugular venous distention [No Lymphadenopathy] : no lymphadenopathy [Supple] : supple [Thyroid Normal, No Nodules] : the thyroid was normal and there were no nodules present [No Respiratory Distress] : no respiratory distress  [No Accessory Muscle Use] : no accessory muscle use [Clear to Auscultation] : lungs were clear to auscultation bilaterally [Normal Rate] : normal rate  [Regular Rhythm] : with a regular rhythm [Normal S1, S2] : normal S1 and S2 [No Murmur] : no murmur heard [No Carotid Bruits] : no carotid bruits [No Abdominal Bruit] : a ~M bruit was not heard ~T in the abdomen [No Varicosities] : no varicosities [Pedal Pulses Present] : the pedal pulses are present [No Palpable Aorta] : no palpable aorta [No Extremity Clubbing/Cyanosis] : no extremity clubbing/cyanosis [Soft] : abdomen soft [Non Tender] : non-tender [Non-distended] : non-distended [No Masses] : no abdominal mass palpated [No HSM] : no HSM [Normal Bowel Sounds] : normal bowel sounds [Normal Posterior Cervical Nodes] : no posterior cervical lymphadenopathy [Normal Anterior Cervical Nodes] : no anterior cervical lymphadenopathy [No CVA Tenderness] : no CVA  tenderness [No Spinal Tenderness] : no spinal tenderness [No Joint Swelling] : no joint swelling [Grossly Normal Strength/Tone] : grossly normal strength/tone [No Rash] : no rash [Coordination Grossly Intact] : coordination grossly intact [No Focal Deficits] : no focal deficits [Normal Gait] : normal gait [Deep Tendon Reflexes (DTR)] : deep tendon reflexes were 2+ and symmetric [Normal Affect] : the affect was normal [Normal Insight/Judgement] : insight and judgment were intact [de-identified] : 1-2+ b/l le edema home

## 2022-08-18 ENCOUNTER — APPOINTMENT (OUTPATIENT)
Dept: OBGYN | Facility: CLINIC | Age: 37
End: 2022-08-18

## 2022-08-18 VITALS
DIASTOLIC BLOOD PRESSURE: 81 MMHG | SYSTOLIC BLOOD PRESSURE: 126 MMHG | HEIGHT: 68 IN | WEIGHT: 185 LBS | BODY MASS INDEX: 28.04 KG/M2

## 2022-08-18 DIAGNOSIS — N89.8 OTHER SPECIFIED NONINFLAMMATORY DISORDERS OF VAGINA: ICD-10-CM

## 2022-08-18 DIAGNOSIS — B37.3 CANDIDIASIS OF VULVA AND VAGINA: ICD-10-CM

## 2022-08-18 DIAGNOSIS — Z00.00 ENCOUNTER FOR GENERAL ADULT MEDICAL EXAMINATION W/OUT ABNORMAL FINDINGS: ICD-10-CM

## 2022-08-18 PROCEDURE — 76817 TRANSVAGINAL US OBSTETRIC: CPT

## 2022-08-18 PROCEDURE — 99214 OFFICE O/P EST MOD 30 MIN: CPT | Mod: 25

## 2022-08-18 PROCEDURE — 36415 COLL VENOUS BLD VENIPUNCTURE: CPT

## 2022-08-18 NOTE — PROCEDURE
[GC Chlamydia Culture] : GC Chlamydia Culture [Affirm (Triple Culture)] : Affirm (triple culture) [Tolerated Well] : the patient tolerated the procedure well [No Complications] : there were no complications [Transvaginal OB Sonogram] : Transvaginal OB Sonogram [Intrauterine Pregnancy] : intrauterine pregnancy [Yolk Sac] : yolk sac present [Fetal Heart] : no fetal heart [Current GA by Sonogram: ___ (wks)] : Current GA by Sonogram: [unfilled]Uwks [___ day(s)] : [unfilled] days [FreeTextEntry1] : very early iup; small YS ,FP measuring 6w0d by CRL. not c/w LMP 6w6d.

## 2022-08-18 NOTE — PHYSICAL EXAM
[Appropriately responsive] : appropriately responsive [Alert] : alert [No Acute Distress] : no acute distress [Soft] : soft [Non-tender] : non-tender [Non-distended] : non-distended [No HSM] : No HSM [No Lesions] : no lesions [No Mass] : no mass [Oriented x3] : oriented x3 [Labia Majora] : normal [Labia Minora] : normal [Normal] : normal [Uterine Adnexae] : normal [Chaperone Declined] : Patient declined chaperone [Discharge] : a  ~M vaginal discharge was present [Scant] : scant [Foul Smelling] : not foul smelling [Brown] : brown [Thin] : thin

## 2022-08-18 NOTE — REVIEW OF SYSTEMS
[Negative] : Heme/Lymph [Patient Intake Form Reviewed] : Patient intake form was reviewed [Nausea] : nausea [FreeTextEntry8] : vaginal discharge

## 2022-08-18 NOTE — COUNSELING
[Nutrition/ Exercise/ Weight Management] : nutrition, exercise, weight management [Vitamins/Supplements] : vitamins/supplements [Breast Self Exam] : breast self exam [Pregnancy Options] : pregnancy options [Medication Management] : medication management [Drugs/Alcohol] : drugs, alcohol [Preconception Care/ Fertility options] : preconception care, fertility options [STD (testing, results, tx)] : STD (testing, results, tx) [Lab Results] : lab results [Other ___] : [unfilled]

## 2022-08-18 NOTE — HISTORY OF PRESENT ILLNESS
[FreeTextEntry1] : 36 year old , , LMP 2022, presents today for amenorrhea/confirmation of pregnancy. Pt reports regular menses, occurs every month, approx every 32 days.. Pt is c/o thick white/brown vaginal d/c for the past week. \par \par Pt had telehealth appt w/ Dr. Man May 2022 and was trying to conceive. Dr. Man had prescribed her clomid, and patient took it as discussed.\par \par Pt had bariatric surgery- gastric sleeve in 2021. Lost >100+lbs since sx Patient started getting regular periods after her surgery.\par \par Pt did not have HTN issues after her first pregnancy, elevated BPs but no proteinuria per pt. \par  GDM during first pregnancy. \par \par Surgical history of cholecystectomy, ovarian cystectomy, gastric sleeve and D&C.\par \par OB HX: 1 FT NVD, mab x2\par \par Pt is not taking any medication currently. \par \par Denies any anxiety or depression. Denies alcohol/drugs/tobacco use. \par \par Pt did not have genetic carrier screening done with prior pregnancies.  [PapSmeardate] : 01/2022

## 2022-08-31 ENCOUNTER — APPOINTMENT (OUTPATIENT)
Dept: OBGYN | Facility: CLINIC | Age: 37
End: 2022-08-31

## 2022-08-31 VITALS
DIASTOLIC BLOOD PRESSURE: 71 MMHG | BODY MASS INDEX: 28.04 KG/M2 | SYSTOLIC BLOOD PRESSURE: 117 MMHG | HEIGHT: 68 IN | WEIGHT: 185 LBS

## 2022-08-31 PROCEDURE — 36415 COLL VENOUS BLD VENIPUNCTURE: CPT

## 2022-08-31 PROCEDURE — 76817 TRANSVAGINAL US OBSTETRIC: CPT

## 2022-08-31 PROCEDURE — 99213 OFFICE O/P EST LOW 20 MIN: CPT

## 2022-08-31 NOTE — PLAN
[FreeTextEntry1] : 36 year old F pt presents for amenorrhea visit, MAB dx\par -discussed treatment options, medical v surgical. declining surgery, discussed APLS work up. understands will not be able to get fetal genetics\par Discussed the option of termination of pregnancy at length with the patient. The risks of termination, long term sequela, and option of continuing with the pregnancy were provided to the patient. The patient desires medical termination of pregnancy. The risks of Mifiprex and cytotec were discussed with the patient at length, including the risk of infection, bleeding, incomplete evacuation, and failure of treatment. Pt. denies chronic medical conditions such as cardiovascular, hypertensive, hepatic, reparatory or renal disease; insulin dependent diabetes mellitus, severe anemia and smoking.  Specific instructions and warning signs were provided for the unlikely, but extremely serious complication of sepsis during medical termination procedure.   Fully advised patient of treatment procedure and its effects.  A copy of medication guide and patient agreement and advise to review both prior to administration was given to patient.   All questions answered.  Patient also advised to carry information regarding medication with them should they need to visit ED.Patient to f/u in office in 2 weeks for repeat ultrasound and BHCG level evaluation.  Patient given rx for antibiotic, pain and nausea medication . Patient advised to call the office if no vaginal bleeding occurs. \par Plan: Day 1--mifeprex tabs x1, 24-48 hours later-take 4, 200mcg tabs of cytotec per vagina.  Day 7-14. followup sono and exam/hcg.  Pt. verbalized understanding of all information and follow-up including vaginal bleeding and uterine cramping will probably occur, prolonged heavy bleeding is not proof of a complete , if treatment fails, there is a risk of fetal malformation, medical  treatment failures are managed by surgical termination and another pregnancy can occur following successful termination prior to menses resuming and contraception must be initiated as soon as termination has been confirmed or before resuming intercourse. \par Mifeprex given today Exp 2025\par [id number]\par all consents signed\par rx sent to pharamcy, pt declining perocet\par HCG, cbc drawn, RH+\par \par July Man MD

## 2022-08-31 NOTE — HISTORY OF PRESENT ILLNESS
[FreeTextEntry1] : 36 year old  F pt presents for F/u amenorrhea visit. Denies any complaints.\par hx of prior   MAB, normal genetics\par \par OBHx: 1 FT NVD, mab x2\par PMHx: Gallstones, gestational diabetes mellitus [PapSmeardate] : 01/2022

## 2022-08-31 NOTE — END OF VISIT
[FreeTextEntry3] : I, Kermit Geshalini acted as a scribe on behalf of Dr. Man on 08/31/2022 \par \par All medical entries made by the scribe were at my, Dr. Man, direction and personally dictated by me on 08/31/2022. I have reviewed the chart and agree that the record accurately reflects my personal performance of the history, physical exam, assessment and plan. I have also personally directed, reviewed, and agreed with the chart.\par

## 2022-08-31 NOTE — PROCEDURE
[Amenorrhea] : Amenorrhea [Transvaginal Ultrasound] : transvaginal ultrasound [FreeTextEntry4] : MAB dx 6w3day, no FHR, no FF, no adenxal masses

## 2022-09-01 LAB
BASOPHILS # BLD AUTO: 0.02 K/UL
BASOPHILS NFR BLD AUTO: 0.2 %
EOSINOPHIL # BLD AUTO: 0.04 K/UL
EOSINOPHIL NFR BLD AUTO: 0.5 %
HCG SERPL-MCNC: ABNORMAL MIU/ML
HCT VFR BLD CALC: 42.3 %
HGB BLD-MCNC: 13.9 G/DL
IMM GRANULOCYTES NFR BLD AUTO: 0.2 %
LYMPHOCYTES # BLD AUTO: 2.43 K/UL
LYMPHOCYTES NFR BLD AUTO: 29.9 %
MAN DIFF?: NORMAL
MCHC RBC-ENTMCNC: 30.5 PG
MCHC RBC-ENTMCNC: 32.9 GM/DL
MCV RBC AUTO: 92.8 FL
MONOCYTES # BLD AUTO: 0.54 K/UL
MONOCYTES NFR BLD AUTO: 6.6 %
NEUTROPHILS # BLD AUTO: 5.09 K/UL
NEUTROPHILS NFR BLD AUTO: 62.6 %
PLATELET # BLD AUTO: 286 K/UL
RBC # BLD: 4.56 M/UL
RBC # FLD: 12.7 %
WBC # FLD AUTO: 8.14 K/UL

## 2022-09-07 ENCOUNTER — NON-APPOINTMENT (OUTPATIENT)
Age: 37
End: 2022-09-07

## 2022-09-08 ENCOUNTER — NON-APPOINTMENT (OUTPATIENT)
Age: 37
End: 2022-09-08

## 2022-09-14 ENCOUNTER — APPOINTMENT (OUTPATIENT)
Dept: OBGYN | Facility: CLINIC | Age: 37
End: 2022-09-14

## 2022-09-14 VITALS
DIASTOLIC BLOOD PRESSURE: 82 MMHG | BODY MASS INDEX: 28.04 KG/M2 | HEART RATE: 88 BPM | HEIGHT: 68 IN | SYSTOLIC BLOOD PRESSURE: 129 MMHG | WEIGHT: 185 LBS

## 2022-09-14 DIAGNOSIS — O02.1 MISSED ABORTION: ICD-10-CM

## 2022-09-14 PROCEDURE — 99213 OFFICE O/P EST LOW 20 MIN: CPT

## 2022-09-14 NOTE — PLAN
[FreeTextEntry1] : 36 year old F pt presents for follow-up MAB\par -thin lining seen\par -HCG\par -discussed need to trend to zero.\par all questions answered\par July Man MD

## 2022-09-14 NOTE — HISTORY OF PRESENT ILLNESS
[FreeTextEntry1] : 36 year old F  pt presents for follow-up  s/p MAB. States she feels well, having light bleeding. RH+

## 2022-09-14 NOTE — PROCEDURE
[Amenorrhea] : Amenorrhea [Transvaginal Ultrasound] : transvaginal ultrasound [FreeTextEntry4] : thin lining seen

## 2022-09-15 LAB — HCG SERPL-MCNC: 68 MIU/ML

## 2022-09-21 ENCOUNTER — APPOINTMENT (OUTPATIENT)
Dept: OBGYN | Facility: CLINIC | Age: 37
End: 2022-09-21

## 2022-09-21 ENCOUNTER — NON-APPOINTMENT (OUTPATIENT)
Age: 37
End: 2022-09-21

## 2022-09-21 DIAGNOSIS — Z31.69 ENCOUNTER FOR OTHER GENERAL COUNSELING AND ADVICE ON PROCREATION: ICD-10-CM

## 2022-09-21 PROCEDURE — 99213 OFFICE O/P EST LOW 20 MIN: CPT | Mod: 95

## 2022-09-23 RX ORDER — MISOPROSTOL 200 UG/1
200 TABLET ORAL
Qty: 4 | Refills: 1 | Status: DISCONTINUED | COMMUNITY
Start: 2022-08-31 | End: 2022-09-23

## 2022-09-23 RX ORDER — CLOMIPHENE CITRATE 50 MG/1
50 TABLET ORAL DAILY
Qty: 5 | Refills: 1 | Status: DISCONTINUED | COMMUNITY
Start: 2022-05-11 | End: 2022-09-23

## 2022-09-23 RX ORDER — ONDANSETRON 4 MG/1
4 TABLET ORAL EVERY 6 HOURS
Qty: 10 | Refills: 0 | Status: DISCONTINUED | COMMUNITY
Start: 2022-08-31 | End: 2022-09-23

## 2022-09-23 RX ORDER — AZITHROMYCIN 500 MG/1
500 TABLET, FILM COATED ORAL
Qty: 2 | Refills: 0 | Status: DISCONTINUED | COMMUNITY
Start: 2022-08-31 | End: 2022-09-23

## 2022-09-23 NOTE — END OF VISIT
[FreeTextEntry3] : I, Suellen Brennan, acted as a scribe on behalf of Dr. Gabriella Cheek on 09/21/2022 \par \par All medical entries made by the scribe were at my, Dr. Gabriella Cheek, direction and personally dictated by me on 09/21/2022 . I have reviewed the chart and agree that the record accurately reflects my personal performance of the history, physical exam, assessment and plan. I have also personally directed, reviewed, and agreed with the chart\par  [Time Spent: ___ minutes] : I have spent [unfilled] minutes of time on the encounter.

## 2022-09-23 NOTE — PLAN
[FreeTextEntry1] : 37 y/o  female, preconception counseling after recent miscarriage. \par (LMP 22, prior to recent miscarraige)\par \par Recurrent miscarriages\par -discussed possible etiologies\par -emotional support provided\par -rx blood work --HgbA1C, TSH, antiphospholipid antibody syndrome, maternal karyotype. rx mailed as pt [facility]\par -discussed sending 's blood for karyotype\par -rx SHG\par -discussed option for evaluation with [facility]. option for IVF and transfer of euploid embryo- reviewed increased time/expense. 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 recommend daily baby asa\par after b-hcg trends to 0, pt should expect menses. can try to conceive after next menses- discussed often increased fertility following miscarriage\par \par pt to return for follow up b-hcg level as previously planned\par \par will call to review lab results\par RTO with positive pregnancy test or prn

## 2022-09-23 NOTE — HISTORY OF PRESENT ILLNESS
[Home] : at home, [unfilled] , at the time of the visit. [Medical Office: ([facility])___] : at the medical office located in  [Verbal consent obtained from patient] : the patient, [unfilled] [FreeTextEntry1] : 37 y/o  female presents for preconception counseling after recent miscarriage. Pt reports no menses since last mab. (LMP 22)\par \par Pt s/p  in , 2.5 kilo baby. Took 1 year to conceive. pt was able to conceive successfully on Clomiphene. Pt had mild PEC and gDM with this pregnancy. \par First mab @8 weeks in 2018,  medication management. \par Second Mab and d&c, diagnosed at 11+wks, measuring smaller, in 3/2019. \par Third Mab in 2022 @6 weeks, medication management. \par \par Of note, between second and third miscarriage, pt underwent gastric sleeve and lost over 100 lbs. Pt reports oligomenorrhea/amenorrhea for essentially 2 years prior to bariatric surgery and  then resumed normal menses s/p surgery.   \par \par Pt sees PCP regularly. \par On review of sx, pt reports unilateral edema of her leg. Has had work up with clear etiology.  \par denies other recent changes to medical health\par \par Pt had sema4 carrier screening. Positive for Odonto-onycho-dermal dysplasia. Pt  recommended to be tested - has not done saliva kit yet.\par \par \par Gyn: vulvovaginitis candida, hx miscarriages, PCOS, irregular menses-> resolved post bariatric surgery\par Ob: (2016- FT, PEC, GDMA), mab (, , ) \par PMH: gallstones, morbid obesity, high blood pressure \par PSH: bariatric surgery/gastric sleeve in [place] (summer ), Lsc cholecystectomy () and tonsillectomy, prior lsc ovarian cystectomy\par Fam History: arthritis (mother), HTN (mother, father) , gallstones (mother, father) \par Social History: , occasional social alcohol use. no tob/drug use. lives at home w , child.\par current meds: none\par NKDA\par \par Accepts blood products\par No VTE\par Covid vaccine x2

## 2022-12-01 ENCOUNTER — NON-APPOINTMENT (OUTPATIENT)
Age: 37
End: 2022-12-01

## 2022-12-08 ENCOUNTER — APPOINTMENT (OUTPATIENT)
Dept: OBGYN | Facility: CLINIC | Age: 37
End: 2022-12-08

## 2022-12-08 VITALS
DIASTOLIC BLOOD PRESSURE: 80 MMHG | BODY MASS INDEX: 30.46 KG/M2 | WEIGHT: 201 LBS | HEIGHT: 68 IN | SYSTOLIC BLOOD PRESSURE: 117 MMHG

## 2022-12-08 DIAGNOSIS — N91.1 SECONDARY AMENORRHEA: ICD-10-CM

## 2022-12-08 DIAGNOSIS — N96 RECURRENT PREGNANCY LOSS: ICD-10-CM

## 2022-12-08 DIAGNOSIS — Z32.01 ENCOUNTER FOR PREGNANCY TEST, RESULT POSITIVE: ICD-10-CM

## 2022-12-08 PROCEDURE — 76817 TRANSVAGINAL US OBSTETRIC: CPT

## 2022-12-08 PROCEDURE — 36415 COLL VENOUS BLD VENIPUNCTURE: CPT

## 2022-12-08 PROCEDURE — 99214 OFFICE O/P EST MOD 30 MIN: CPT | Mod: 25

## 2022-12-08 NOTE — HISTORY OF PRESENT ILLNESS
[FreeTextEntry1] : 37 y/o  pt presents today for an amenorrhea visit. LMP 10/14/22. Last seen for follow up of mab 2022; bhcg 68. was supposed to have repeat done, never had repeat. Was seen by Dr. Cheek for pre-conception consult in 2022; was sent for RPL workup but did not have labs done as of today. \par \par Pt s/p  in , 2.5 kilo baby. Took 1 year to conceive. pt was able to conceive successfully on Clomiphene. Pt had mild PEC and gDM with this pregnancy. \par First mab @8 weeks in 2018, medication management. \par Second Mab and d&c, diagnosed at 11+wks, measuring smaller, in 3/2019. \par Third Mab in 2022 @6 weeks, medication management. \par \par Of note, between second and third miscarriage, pt underwent gastric sleeve and lost over 100 lbs. Pt reports oligomenorrhea/amenorrhea for essentially 2 years prior to bariatric surgery and then resumed normal menses s/p surgery. \par \par Pt sees PCP regularly. \par On review of sx, pt reports unilateral edema of her leg. Has had work up with clear etiology. \par denies other recent changes to medical health\par \par Pt had sema4 carrier screening. Positive for Odonto-onycho-dermal dysplasia. Pt  recommended to be tested - has not done saliva kit yet.\par \par \par Gyn: vulvovaginitis candida, hx miscarriages, PCOS, irregular menses-> resolved post bariatric surgery\par Ob: (2016- FT, PEC, GDMA), mab (, , ) \par PMH: gallstones, morbid obesity, high blood pressure \par PSH: bariatric surgery/gastric sleeve in [place] (summer 2021), Lsc cholecystectomy () and tonsillectomy, prior lsc ovarian cystectomy\par Fam History: arthritis (mother), HTN (mother, father) , gallstones (mother, father) \par Social History: , occasional social alcohol use. no tob/drug use. lives at home w , child.\par current meds: none\par NKDA\par \par Accepts blood products\par No VTE\par Covid vaccine x2 \par \par Denies any anxiety or depression. Denies alcohol/drugs/tobacco use. \par

## 2022-12-08 NOTE — COUNSELING
[Nutrition/ Exercise/ Weight Management] : nutrition, exercise, weight management [Vitamins/Supplements] : vitamins/supplements [Preconception Care/ Fertility options] : preconception care, fertility options [Confidentiality] : confidentiality [STD (testing, results, tx)] : STD (testing, results, tx) [Vaccines] : vaccines [Lab Results] : lab results [Medication Management] : medication management [Other ___] : [unfilled]

## 2022-12-08 NOTE — PLAN
[FreeTextEntry1] : Amenorrhea\par CRL 7w2d, LMP 7w5d. by lmp 10/15/22, REY 7/22/2023.\par -early pregnancy precautions reviewed\par -rto 2 weeks for interval growth; pt hx rpl; very nervous\par -NOB blood panel to be done at [facility]\par -needs breast exam @ [facility]\par -ucx, gc and progesterone level done today\par -dicussed possible progesterone supplementation if needed\par -start bASA daily\par -aware fob needed testing for positive condition on carrier screening\par -continue PNV\par -to call with bleeding/pain\par -task sent to RN group after pt visit this evening to ensure that she goes to lab for RPL blood panel.\par \par RTO 2 weeks for follow up to amenorrhea visit. confirm pregnancy. \par \par During this visit 30 minutes were spent face-to-face with greater than 50% of this time dedicated to counseling.\par \par \par \par \par

## 2022-12-08 NOTE — PHYSICAL EXAM
[Chaperone Declined] : Patient declined chaperone [Appropriately responsive] : appropriately responsive [Alert] : alert [No Acute Distress] : no acute distress [Soft] : soft [Non-tender] : non-tender [Oriented x3] : oriented x3 [Labia Majora] : normal [Labia Minora] : normal [Normal] : normal [Uterine Adnexae] : normal

## 2022-12-08 NOTE — REVIEW OF SYSTEMS
[Patient Intake Form Reviewed] : Patient intake form was reviewed [Fatigue] : fatigue [Nausea] : nausea [Negative] : Heme/Lymph

## 2022-12-08 NOTE — PROCEDURE
[GC Chlamydia Culture] : GC Chlamydia Culture [Tolerated Well] : the patient tolerated the procedure well [No Complications] : there were no complications [Transvaginal OB Sonogram] : Transvaginal OB Sonogram [Intrauterine Pregnancy] : intrauterine pregnancy [Yolk Sac] : yolk sac present [Fetal Heart] : fetal heart present [Date: ___] : Date: [unfilled] [Current GA by Sonogram: ___ (wks)] : Current GA by Sonogram: [unfilled]Uwks [___ day(s)] : [unfilled] days [Transvaginal OB Sonogram WNL] : Transvaginal OB Sonogram WNL [FreeTextEntry1] : CRL 7w2d\par LMP 7w5d

## 2022-12-13 LAB
BACTERIA UR CULT: NORMAL
C TRACH RRNA SPEC QL NAA+PROBE: NOT DETECTED
N GONORRHOEA RRNA SPEC QL NAA+PROBE: NOT DETECTED
PROGEST SERPL-MCNC: 24.1 NG/ML
SOURCE AMPLIFICATION: NORMAL

## 2022-12-23 ENCOUNTER — APPOINTMENT (OUTPATIENT)
Dept: OBGYN | Facility: CLINIC | Age: 37
End: 2022-12-23
Payer: COMMERCIAL

## 2022-12-23 VITALS
DIASTOLIC BLOOD PRESSURE: 79 MMHG | OXYGEN SATURATION: 100 % | HEART RATE: 82 BPM | WEIGHT: 205 LBS | HEIGHT: 68 IN | BODY MASS INDEX: 31.07 KG/M2 | SYSTOLIC BLOOD PRESSURE: 122 MMHG

## 2022-12-23 DIAGNOSIS — Z12.39 ENCOUNTER FOR OTHER SCREENING FOR MALIGNANT NEOPLASM OF BREAST: ICD-10-CM

## 2022-12-23 DIAGNOSIS — Z13.88 ENCOUNTER FOR SCREENING FOR DISORDER DUE TO EXPOSURE TO CONTAMINANTS: ICD-10-CM

## 2022-12-23 DIAGNOSIS — Z13.1 ENCOUNTER FOR SCREENING FOR DIABETES MELLITUS: ICD-10-CM

## 2022-12-23 DIAGNOSIS — Z36.9 ENCOUNTER FOR ANTENATAL SCREENING, UNSPECIFIED: ICD-10-CM

## 2022-12-23 DIAGNOSIS — Z11.3 ENCOUNTER FOR SCREENING FOR INFECTIONS WITH A PREDOMINANTLY SEXUAL MODE OF TRANSMISSION: ICD-10-CM

## 2022-12-23 DIAGNOSIS — Z13.0 ENCOUNTER FOR SCREENING FOR DISEASES OF THE BLOOD AND BLOOD-FORMING ORGANS AND CERTAIN DISORDERS INVOLVING THE IMMUNE MECHANISM: ICD-10-CM

## 2022-12-23 DIAGNOSIS — Z01.84 ENCOUNTER FOR ANTIBODY RESPONSE EXAMINATION: ICD-10-CM

## 2022-12-23 DIAGNOSIS — Z13.29 ENCOUNTER FOR SCREENING FOR OTHER SUSPECTED ENDOCRINE DISORDER: ICD-10-CM

## 2022-12-23 DIAGNOSIS — Z13.21 ENCOUNTER FOR SCREENING FOR NUTRITIONAL DISORDER: ICD-10-CM

## 2022-12-23 LAB
BASOPHILS # BLD AUTO: 0.02 K/UL
BASOPHILS NFR BLD AUTO: 0.2 %
EOSINOPHIL # BLD AUTO: 0.03 K/UL
EOSINOPHIL NFR BLD AUTO: 0.3 %
HCT VFR BLD CALC: 38.1 %
HGB BLD-MCNC: 13.3 G/DL
IMM GRANULOCYTES NFR BLD AUTO: 0.3 %
LYMPHOCYTES # BLD AUTO: 3.18 K/UL
LYMPHOCYTES NFR BLD AUTO: 26.8 %
MAN DIFF?: NORMAL
MCHC RBC-ENTMCNC: 30.4 PG
MCHC RBC-ENTMCNC: 34.9 GM/DL
MCV RBC AUTO: 87.2 FL
MONOCYTES # BLD AUTO: 0.59 K/UL
MONOCYTES NFR BLD AUTO: 5 %
NEUTROPHILS # BLD AUTO: 8.02 K/UL
NEUTROPHILS NFR BLD AUTO: 67.4 %
PLATELET # BLD AUTO: 328 K/UL
RBC # BLD: 4.37 M/UL
RBC # FLD: 12.1 %
WBC # FLD AUTO: 11.87 K/UL

## 2022-12-23 PROCEDURE — 36415 COLL VENOUS BLD VENIPUNCTURE: CPT

## 2022-12-23 PROCEDURE — 99214 OFFICE O/P EST MOD 30 MIN: CPT | Mod: 25

## 2022-12-23 PROCEDURE — 76817 TRANSVAGINAL US OBSTETRIC: CPT

## 2022-12-23 NOTE — COUNSELING
[Nutrition/ Exercise/ Weight Management] : nutrition, exercise, weight management [Vitamins/Supplements] : vitamins/supplements [Pregnancy Options] : pregnancy options [Confidentiality] : confidentiality [STD (testing, results, tx)] : STD (testing, results, tx) [Vaccines] : vaccines [Influenza Vaccine] : influenza vaccine [Lab Results] : lab results [Medication Management] : medication management

## 2022-12-23 NOTE — REASON FOR VISIT
[Follow-Up] : a follow-up evaluation of [Amenorrhea] : amenorrhea [Friend] : friend [FreeTextEntry2] : f/u to confirm pregnancy

## 2022-12-23 NOTE — PLAN
[FreeTextEntry1] : f/u amenorrhea visit from 12/8/22; confirmation of pregnancy; hx RPL\par -CRL 10w0d,   by lmp 10/15/22 9w6d; maintain REY 7/22/2023 by lmp dating. Normal interval growth, +FHR. \par -early pregnancy precautions reviewed\par -normal breast/pelvic exam today\par -gc, ucx, progesterone normal last visit\par -pap UTD\par -aware fob needed testing for positive condition on carrier screening; reminded fob needs testing again today.\par -continue PNVs\par -prior NVD\par \par HX RPL\par -s/p preconception counseling [facility] 9/2022\par -went to [facility] for RPL panel 12/16/22, no labds for review today\par -normal progesterone level\par -increase bASA to 2 tabs daily\par \par RTO 2 weeks for NOB/NT sono/NIPS.\par \par During this visit 30 minutes were spent face-to-face with greater than 50% of this time dedicated to counseling.\par \par \par

## 2022-12-23 NOTE — HISTORY OF PRESENT ILLNESS
[FreeTextEntry1] : 35 y/o   presents today for a follow up to her amenorrhea visit from 22. hx rpl, pt needed a short interval follow up to confirm her pregnancy. also needs nob panel and breast exam done/.  LMP 10/14/22. Was seen by Dr. Cheek for pre-conception consult in 2022; was sent for RPL workup; had done at [facility] on 22, no results for review as of today. No pelvic pain or abnormal bleeding. \par \par Early iup seen on sono on 22. \par \par \par PRIOR EMR INFO:\par Pt s/p  in , 2.5 kilo baby. Took 1 year to conceive. pt was able to conceive successfully on Clomiphene. Pt had mild PEC and gDM with this pregnancy. \par First mab @8 weeks in 2018, medication management. \par Second Mab and d&c, diagnosed at 11+wks, measuring smaller, in 3/2019. \par Third Mab in 2022 @6 weeks, medication management. \par Of note, between second and third miscarriage, pt underwent gastric sleeve and lost over 100 lbs. Pt reports oligomenorrhea/amenorrhea for essentially 2 years prior to bariatric surgery and then resumed normal menses s/p surgery. \par Pt sees PCP regularly. \par On review of sx, pt reports unilateral edema of her leg. Has had work up with clear etiology. \par denies other recent changes to medical health\par Pt had sema4 carrier screening. Positive for Odonto-onycho-dermal dysplasia. Pt  recommended to be tested - has not done saliva kit yet.\par Gyn: vulvovaginitis candida, hx miscarriages, PCOS, irregular menses-> resolved post bariatric surgery\par Ob: (2016- FT, PEC, GDMA), mab (, , ) \par PMH: gallstones, morbid obesity, high blood pressure \par PSH: bariatric surgery/gastric sleeve in [place] (summer 2021), Lsc cholecystectomy () and tonsillectomy, prior lsc ovarian cystectomy\par Fam History: arthritis (mother), HTN (mother, father) , gallstones (mother, father) \par Social History: , occasional social alcohol use. no tob/drug use. lives at home w , child.\par current meds: none\par NKDA\par \par Accepts blood products\par No VTE\par Covid vaccine x2 \par \par Denies any anxiety or depression. Denies alcohol/drugs/tobacco use. \par  [TextBox_4] : pt here today for f/u amenorrhea.

## 2022-12-26 LAB
ABO + RH PNL BLD: NORMAL
B19V IGG SER QL IA: 5.4 INDEX
B19V IGG+IGM SER-IMP: NORMAL
B19V IGG+IGM SER-IMP: POSITIVE
B19V IGM FLD-ACNC: 0.1 INDEX
B19V IGM SER-ACNC: NEGATIVE
BLD GP AB SCN SERPL QL: NORMAL
ESTIMATED AVERAGE GLUCOSE: 97 MG/DL
HBA1C MFR BLD HPLC: 5 %
HBV SURFACE AG SER QL: NONREACTIVE
HCV AB SER QL: NONREACTIVE
HCV S/CO RATIO: 0.16 S/CO
HIV1+2 AB SPEC QL IA.RAPID: NONREACTIVE
LEAD BLD-MCNC: <1 UG/DL
MEV IGG FLD QL IA: >300 AU/ML
MEV IGG+IGM SER-IMP: POSITIVE
MUV AB SER-ACNC: POSITIVE
MUV IGG SER QL IA: 292 AU/ML
RUBV IGG FLD-ACNC: 9.2 INDEX
RUBV IGG SER-IMP: POSITIVE
T PALLIDUM AB SER QL IA: NEGATIVE
TSH SERPL-ACNC: 1.76 UIU/ML
VZV AB TITR SER: POSITIVE
VZV IGG SER IF-ACNC: 1235 INDEX

## 2022-12-27 LAB
25(OH)D3 SERPL-MCNC: 21.2 NG/ML
HGB A MFR BLD: 97.7 %
HGB A2 MFR BLD: 2.3 %
HGB FRACT BLD-IMP: NORMAL

## 2023-01-02 ENCOUNTER — NON-APPOINTMENT (OUTPATIENT)
Age: 38
End: 2023-01-02

## 2023-01-03 ENCOUNTER — NON-APPOINTMENT (OUTPATIENT)
Age: 38
End: 2023-01-03

## 2023-01-13 ENCOUNTER — NON-APPOINTMENT (OUTPATIENT)
Age: 38
End: 2023-01-13

## 2023-01-13 ENCOUNTER — APPOINTMENT (OUTPATIENT)
Dept: OBGYN | Facility: CLINIC | Age: 38
End: 2023-01-13
Payer: COMMERCIAL

## 2023-01-13 ENCOUNTER — ASOB RESULT (OUTPATIENT)
Age: 38
End: 2023-01-13

## 2023-01-13 DIAGNOSIS — O09.299 SUPERVISION OF PREGNANCY WITH OTHER POOR REPRODUCTIVE OR OBSTETRIC HISTORY, UNSPECIFIED TRIMESTER: ICD-10-CM

## 2023-01-13 PROCEDURE — 0500F INITIAL PRENATAL CARE VISIT: CPT

## 2023-01-13 PROCEDURE — 76801 OB US < 14 WKS SINGLE FETUS: CPT

## 2023-01-13 PROCEDURE — 36415 COLL VENOUS BLD VENIPUNCTURE: CPT

## 2023-01-13 PROCEDURE — 76813 OB US NUCHAL MEAS 1 GEST: CPT

## 2023-01-23 ENCOUNTER — TRANSCRIPTION ENCOUNTER (OUTPATIENT)
Age: 38
End: 2023-01-23

## 2023-02-10 ENCOUNTER — NON-APPOINTMENT (OUTPATIENT)
Age: 38
End: 2023-02-10

## 2023-02-10 ENCOUNTER — APPOINTMENT (OUTPATIENT)
Dept: OBGYN | Facility: CLINIC | Age: 38
End: 2023-02-10
Payer: COMMERCIAL

## 2023-02-10 PROCEDURE — 36415 COLL VENOUS BLD VENIPUNCTURE: CPT

## 2023-02-10 PROCEDURE — 0502F SUBSEQUENT PRENATAL CARE: CPT

## 2023-02-13 LAB
AFP MOM: 1.09
AFP VALUE: 35.7 NG/ML
ALPHA FETOPROTEIN SERUM COMMENT: NORMAL
ALPHA FETOPROTEIN SERUM INTERPRETATION: NORMAL
ALPHA FETOPROTEIN SERUM RESULTS: NORMAL
ALPHA FETOPROTEIN SERUM TEST RESULTS: NORMAL
GESTATIONAL AGE BASED ON: NORMAL
GESTATIONAL AGE ON COLLECTION DATE: 17 WEEKS
INSULIN DEP DIABETES: NO
MATERNAL AGE AT EDD AFP: 35.6 YR
MULTIPLE GESTATION: NO
OSBR RISK 1 IN: 9231
RACE: NORMAL
WEIGHT AFP: 207 LBS

## 2023-03-10 ENCOUNTER — APPOINTMENT (OUTPATIENT)
Dept: ANTEPARTUM | Facility: CLINIC | Age: 38
End: 2023-03-10
Payer: COMMERCIAL

## 2023-03-10 ENCOUNTER — ASOB RESULT (OUTPATIENT)
Age: 38
End: 2023-03-10

## 2023-03-10 ENCOUNTER — NON-APPOINTMENT (OUTPATIENT)
Age: 38
End: 2023-03-10

## 2023-03-10 PROCEDURE — 76811 OB US DETAILED SNGL FETUS: CPT

## 2023-03-24 ENCOUNTER — NON-APPOINTMENT (OUTPATIENT)
Age: 38
End: 2023-03-24

## 2023-03-24 ENCOUNTER — APPOINTMENT (OUTPATIENT)
Dept: OBGYN | Facility: CLINIC | Age: 38
End: 2023-03-24
Payer: COMMERCIAL

## 2023-03-24 VITALS
BODY MASS INDEX: 33.8 KG/M2 | DIASTOLIC BLOOD PRESSURE: 80 MMHG | HEART RATE: 81 BPM | WEIGHT: 223 LBS | SYSTOLIC BLOOD PRESSURE: 123 MMHG | HEIGHT: 68 IN

## 2023-03-24 DIAGNOSIS — Z34.82 ENCOUNTER FOR SUPERVISION OF OTHER NORMAL PREGNANCY, SECOND TRIMESTER: ICD-10-CM

## 2023-03-24 PROCEDURE — 0502F SUBSEQUENT PRENATAL CARE: CPT

## 2023-04-13 ENCOUNTER — NON-APPOINTMENT (OUTPATIENT)
Age: 38
End: 2023-04-13

## 2023-04-14 ENCOUNTER — APPOINTMENT (OUTPATIENT)
Dept: OBGYN | Facility: CLINIC | Age: 38
End: 2023-04-14
Payer: COMMERCIAL

## 2023-04-14 DIAGNOSIS — Z34.93 ENCOUNTER FOR SUPERVISION OF NORMAL PREGNANCY, UNSPECIFIED, THIRD TRIMESTER: ICD-10-CM

## 2023-04-14 PROCEDURE — 36415 COLL VENOUS BLD VENIPUNCTURE: CPT

## 2023-04-14 PROCEDURE — 0502F SUBSEQUENT PRENATAL CARE: CPT

## 2023-04-17 ENCOUNTER — NON-APPOINTMENT (OUTPATIENT)
Age: 38
End: 2023-04-17

## 2023-04-17 DIAGNOSIS — R73.09 OTHER ABNORMAL GLUCOSE: ICD-10-CM

## 2023-04-17 LAB
25(OH)D3 SERPL-MCNC: 28.5 NG/ML
BASOPHILS # BLD AUTO: 0.02 K/UL
BASOPHILS NFR BLD AUTO: 0.2 %
BLD GP AB SCN SERPL QL: NORMAL
EOSINOPHIL # BLD AUTO: 0.05 K/UL
EOSINOPHIL NFR BLD AUTO: 0.6 %
GLUCOSE 1H P 50 G GLC PO SERPL-MCNC: 166 MG/DL
HCT VFR BLD CALC: 36.7 %
HGB BLD-MCNC: 12 G/DL
HIV1+2 AB SPEC QL IA.RAPID: NONREACTIVE
IMM GRANULOCYTES NFR BLD AUTO: 0.4 %
LYMPHOCYTES # BLD AUTO: 1.92 K/UL
LYMPHOCYTES NFR BLD AUTO: 23 %
MAN DIFF?: NORMAL
MCHC RBC-ENTMCNC: 31.4 PG
MCHC RBC-ENTMCNC: 32.7 GM/DL
MCV RBC AUTO: 96.1 FL
MONOCYTES # BLD AUTO: 0.5 K/UL
MONOCYTES NFR BLD AUTO: 6 %
NEUTROPHILS # BLD AUTO: 5.83 K/UL
NEUTROPHILS NFR BLD AUTO: 69.8 %
PLATELET # BLD AUTO: 292 K/UL
RBC # BLD: 3.82 M/UL
RBC # FLD: 13.5 %
T PALLIDUM AB SER QL IA: NEGATIVE
WBC # FLD AUTO: 8.35 K/UL

## 2023-05-01 ENCOUNTER — APPOINTMENT (OUTPATIENT)
Dept: OBGYN | Facility: CLINIC | Age: 38
End: 2023-05-01

## 2023-05-12 ENCOUNTER — APPOINTMENT (OUTPATIENT)
Dept: MATERNAL FETAL MEDICINE | Facility: CLINIC | Age: 38
End: 2023-05-12
Payer: COMMERCIAL

## 2023-05-12 ENCOUNTER — APPOINTMENT (OUTPATIENT)
Dept: OBGYN | Facility: CLINIC | Age: 38
End: 2023-05-12
Payer: COMMERCIAL

## 2023-05-12 ENCOUNTER — ASOB RESULT (OUTPATIENT)
Age: 38
End: 2023-05-12

## 2023-05-12 VITALS
DIASTOLIC BLOOD PRESSURE: 68 MMHG | BODY MASS INDEX: 34.4 KG/M2 | SYSTOLIC BLOOD PRESSURE: 124 MMHG | WEIGHT: 227 LBS | HEIGHT: 68 IN

## 2023-05-12 PROCEDURE — G0108 DIAB MANAGE TRN  PER INDIV: CPT | Mod: 95

## 2023-05-12 PROCEDURE — 0502F SUBSEQUENT PRENATAL CARE: CPT

## 2023-05-12 RX ORDER — BLOOD-GLUCOSE METER
W/DEVICE KIT MISCELLANEOUS
Qty: 1 | Refills: 0 | Status: ACTIVE | COMMUNITY
Start: 2023-05-12 | End: 1900-01-01

## 2023-05-12 RX ORDER — BLOOD-GLUCOSE METER
KIT MISCELLANEOUS 4 TIMES DAILY
Qty: 4 | Refills: 2 | Status: ACTIVE | COMMUNITY
Start: 2023-05-12 | End: 1900-01-01

## 2023-05-12 RX ORDER — LANCETS 33 GAUGE
EACH MISCELLANEOUS
Qty: 4 | Refills: 2 | Status: ACTIVE | COMMUNITY
Start: 2023-05-12 | End: 1900-01-01

## 2023-05-26 ENCOUNTER — APPOINTMENT (OUTPATIENT)
Dept: OBGYN | Facility: CLINIC | Age: 38
End: 2023-05-26
Payer: COMMERCIAL

## 2023-05-26 ENCOUNTER — NON-APPOINTMENT (OUTPATIENT)
Age: 38
End: 2023-05-26

## 2023-05-26 ENCOUNTER — MED ADMIN CHARGE (OUTPATIENT)
Age: 38
End: 2023-05-26

## 2023-05-26 ENCOUNTER — ASOB RESULT (OUTPATIENT)
Age: 38
End: 2023-05-26

## 2023-05-26 ENCOUNTER — APPOINTMENT (OUTPATIENT)
Dept: MATERNAL FETAL MEDICINE | Facility: CLINIC | Age: 38
End: 2023-05-26
Payer: COMMERCIAL

## 2023-05-26 DIAGNOSIS — Z23 ENCOUNTER FOR IMMUNIZATION: ICD-10-CM

## 2023-05-26 PROCEDURE — 76819 FETAL BIOPHYS PROFIL W/O NST: CPT | Mod: 59

## 2023-05-26 PROCEDURE — 76816 OB US FOLLOW-UP PER FETUS: CPT

## 2023-05-26 PROCEDURE — 0502F SUBSEQUENT PRENATAL CARE: CPT

## 2023-05-26 PROCEDURE — G0108 DIAB MANAGE TRN  PER INDIV: CPT | Mod: 95

## 2023-06-08 ENCOUNTER — NON-APPOINTMENT (OUTPATIENT)
Age: 38
End: 2023-06-08

## 2023-06-09 ENCOUNTER — APPOINTMENT (OUTPATIENT)
Dept: OBGYN | Facility: CLINIC | Age: 38
End: 2023-06-09
Payer: COMMERCIAL

## 2023-06-09 VITALS
SYSTOLIC BLOOD PRESSURE: 122 MMHG | BODY MASS INDEX: 34.86 KG/M2 | WEIGHT: 230 LBS | DIASTOLIC BLOOD PRESSURE: 74 MMHG | HEIGHT: 68 IN

## 2023-06-09 PROCEDURE — 0502F SUBSEQUENT PRENATAL CARE: CPT

## 2023-06-20 ENCOUNTER — OUTPATIENT (OUTPATIENT)
Dept: OUTPATIENT SERVICES | Facility: HOSPITAL | Age: 38
LOS: 1 days | End: 2023-06-20
Payer: COMMERCIAL

## 2023-06-20 VITALS — SYSTOLIC BLOOD PRESSURE: 118 MMHG | OXYGEN SATURATION: 92 % | HEART RATE: 75 BPM | DIASTOLIC BLOOD PRESSURE: 75 MMHG

## 2023-06-20 VITALS
RESPIRATION RATE: 18 BRPM | TEMPERATURE: 98 F | SYSTOLIC BLOOD PRESSURE: 115 MMHG | HEART RATE: 92 BPM | DIASTOLIC BLOOD PRESSURE: 82 MMHG

## 2023-06-20 DIAGNOSIS — Z90.3 ACQUIRED ABSENCE OF STOMACH [PART OF]: Chronic | ICD-10-CM

## 2023-06-20 DIAGNOSIS — N83.209 UNSPECIFIED OVARIAN CYST, UNSPECIFIED SIDE: Chronic | ICD-10-CM

## 2023-06-20 DIAGNOSIS — Z90.89 ACQUIRED ABSENCE OF OTHER ORGANS: Chronic | ICD-10-CM

## 2023-06-20 DIAGNOSIS — O26.899 OTHER SPECIFIED PREGNANCY RELATED CONDITIONS, UNSPECIFIED TRIMESTER: ICD-10-CM

## 2023-06-20 DIAGNOSIS — Z90.49 ACQUIRED ABSENCE OF OTHER SPECIFIED PARTS OF DIGESTIVE TRACT: Chronic | ICD-10-CM

## 2023-06-20 PROCEDURE — 83986 ASSAY PH BODY FLUID NOS: CPT

## 2023-06-20 PROCEDURE — 59025 FETAL NON-STRESS TEST: CPT

## 2023-06-20 PROCEDURE — 83986 ASSAY PH BODY FLUID NOS: CPT | Mod: QW

## 2023-06-20 PROCEDURE — G0463: CPT

## 2023-06-20 PROCEDURE — 99212 OFFICE O/P EST SF 10 MIN: CPT | Mod: 25

## 2023-06-20 NOTE — OB RN TRIAGE NOTE - NS_OBGYNHISTORY_OBGYN_ALL_OB_FT
x1 (elevated BP, GDM last pregnancy)  misc x3  tonsillectomy  cholecystectomy  ovarian cyst removal

## 2023-06-20 NOTE — OB RN TRIAGE NOTE - FALL HARM RISK - UNIVERSAL INTERVENTIONS
Bed in lowest position, wheels locked, appropriate side rails in place/Call bell, personal items and telephone in reach/Instruct patient to call for assistance before getting out of bed or chair/Non-slip footwear when patient is out of bed/[place] to call system/Physically safe environment - no spills, clutter or unnecessary equipment/Purposeful Proactive Rounding/Room/bathroom lighting operational, light cord in reach

## 2023-06-20 NOTE — OB PROVIDER TRIAGE NOTE - NSHPPHYSICALEXAM_GEN_ALL_CORE
Vital Signs Last 24 Hrs  T(C): 36.9 (20 Jun 2023 18:13), Max: 36.9 (20 Jun 2023 18:08)  T(F): 98.42 (20 Jun 2023 18:13), Max: 98.42 (20 Jun 2023 18:13)  HR: 75 (20 Jun 2023 19:10) (75 - 100)  BP: 118/75 (20 Jun 2023 19:10) (115/82 - 118/75)  RR: 18 (20 Jun 2023 18:08) (18 - 18)  SpO2: 92% (20 Jun 2023 19:10) (92% - 98%)    Gen: alert, NAD  Abd: gravid, soft, non-tender  Ext: wwp, no LE edema or pain bilaterally    SSE: neg pooling, neg nitrazine, neg ferning   SVE: closed/long    EFM: baseline 140, mod variability, +accels, no decels  [place]: +irritability, no contractions  BSUS: vertex, BPP 8/8, BLAIR 16.5cm

## 2023-06-20 NOTE — OB PROVIDER TRIAGE NOTE - HISTORY OF PRESENT ILLNESS
OB Triage Note    38yo  @ 35w4d LMP 10/14/22,  REY 23 presents to triage c/o LOF at 1:30p with some continued leakage wetting her underwear with no further leakage. Notes unchanged pelvic pressure x2 weeks. Denies VB, contractions/cramping. +FM. Last EFW 4lbs 8oz on 6/10.     PNC  AP Issues: h/o GDM and gHTN in prior pregnancy   PNL: GBS unknown     OBHx:   2016 FT , M, 5lbs, c/b GDM and gHTN   SAB   SAB s/p D&C    SAB   GynHx: h/o PCOS, abnormal Paps and ovarian cysts. Denies STI or fibroids   PMH: obesity   PSH: tonsillectomy, LSC ovarian cystectomy in her 20s,  LSC cholecystectomy,  gastric sleeve  Meds: PNV, bASA  Allergies: NKDA  Social: denies alcohol/tobacco/drug use in pregnancy   Psych: denies anxiety/depression     Will accept blood transfusions: Yes

## 2023-06-20 NOTE — OB PROVIDER TRIAGE NOTE - NSOBPROVIDERNOTE_OBGYN_ALL_OB_FT
38yo  @ 35w4d REY 23 presenting to triage c/o LOF, not ruptured with negative pooling/nitrazine/ferning and BLAIR 16cm. SVE closed. Fetal status reassuring with reactive tracing. Maternal vitals stable. Ok to discharge home.     Patient to be discharged home.   Labor precautions discussed with pt, advised to return if LOF, ctxs, VB, decreased FM.   Follow-up with primary OB as scheduled on Friday     Discussed with Dr. Diallo

## 2023-06-22 DIAGNOSIS — E28.2 POLYCYSTIC OVARIAN SYNDROME: ICD-10-CM

## 2023-06-22 DIAGNOSIS — O09.293 SUPERVISION OF PREGNANCY WITH OTHER POOR REPRODUCTIVE OR OBSTETRIC HISTORY, THIRD TRIMESTER: ICD-10-CM

## 2023-06-22 DIAGNOSIS — E66.9 OBESITY, UNSPECIFIED: ICD-10-CM

## 2023-06-22 DIAGNOSIS — Z87.42 PERSONAL HISTORY OF OTHER DISEASES OF THE FEMALE GENITAL TRACT: ICD-10-CM

## 2023-06-22 DIAGNOSIS — O10.013 PRE-EXISTING ESSENTIAL HYPERTENSION COMPLICATING PREGNANCY, THIRD TRIMESTER: ICD-10-CM

## 2023-06-22 DIAGNOSIS — O09.523 SUPERVISION OF ELDERLY MULTIGRAVIDA, THIRD TRIMESTER: ICD-10-CM

## 2023-06-22 DIAGNOSIS — O99.283 ENDOCRINE, NUTRITIONAL AND METABOLIC DISEASES COMPLICATING PREGNANCY, THIRD TRIMESTER: ICD-10-CM

## 2023-06-22 DIAGNOSIS — Z86.32 PERSONAL HISTORY OF GESTATIONAL DIABETES: ICD-10-CM

## 2023-06-22 DIAGNOSIS — Z3A.35 35 WEEKS GESTATION OF PREGNANCY: ICD-10-CM

## 2023-06-22 DIAGNOSIS — Z03.71 ENCOUNTER FOR SUSPECTED PROBLEM WITH AMNIOTIC CAVITY AND MEMBRANE RULED OUT: ICD-10-CM

## 2023-06-23 ENCOUNTER — ASOB RESULT (OUTPATIENT)
Age: 38
End: 2023-06-23

## 2023-06-23 ENCOUNTER — APPOINTMENT (OUTPATIENT)
Dept: OBGYN | Facility: CLINIC | Age: 38
End: 2023-06-23
Payer: COMMERCIAL

## 2023-06-23 DIAGNOSIS — Z34.93 ENCOUNTER FOR SUPERVISION OF NORMAL PREGNANCY, UNSPECIFIED, THIRD TRIMESTER: ICD-10-CM

## 2023-06-23 PROCEDURE — 0502F SUBSEQUENT PRENATAL CARE: CPT

## 2023-06-23 PROCEDURE — 76816 OB US FOLLOW-UP PER FETUS: CPT

## 2023-06-26 ENCOUNTER — NON-APPOINTMENT (OUTPATIENT)
Age: 38
End: 2023-06-26

## 2023-06-30 ENCOUNTER — APPOINTMENT (OUTPATIENT)
Dept: OBGYN | Facility: CLINIC | Age: 38
End: 2023-06-30
Payer: COMMERCIAL

## 2023-06-30 PROCEDURE — 0502F SUBSEQUENT PRENATAL CARE: CPT

## 2023-07-07 ENCOUNTER — NON-APPOINTMENT (OUTPATIENT)
Age: 38
End: 2023-07-07

## 2023-07-07 ENCOUNTER — APPOINTMENT (OUTPATIENT)
Dept: OBGYN | Facility: CLINIC | Age: 38
End: 2023-07-07
Payer: COMMERCIAL

## 2023-07-07 DIAGNOSIS — O09.513 SUPERVISION OF ELDERLY PRIMIGRAVIDA, THIRD TRIMESTER: ICD-10-CM

## 2023-07-07 PROCEDURE — 0502F SUBSEQUENT PRENATAL CARE: CPT

## 2023-07-11 NOTE — ED PROVIDER NOTE - WR ORDER ID 1
[id number] Oral Minoxidil Pregnancy And Lactation Text: This medication should only be used when clearly needed if you are pregnant, attempting to become pregnant or breast feeding.

## 2023-07-14 ENCOUNTER — APPOINTMENT (OUTPATIENT)
Dept: OBGYN | Facility: CLINIC | Age: 38
End: 2023-07-14
Payer: COMMERCIAL

## 2023-07-14 PROCEDURE — 0502F SUBSEQUENT PRENATAL CARE: CPT

## 2023-07-19 ENCOUNTER — APPOINTMENT (OUTPATIENT)
Dept: OBGYN | Facility: CLINIC | Age: 38
End: 2023-07-19
Payer: COMMERCIAL

## 2023-07-19 ENCOUNTER — ASOB RESULT (OUTPATIENT)
Age: 38
End: 2023-07-19

## 2023-07-19 PROCEDURE — 76818 FETAL BIOPHYS PROFILE W/NST: CPT

## 2023-07-19 PROCEDURE — 0502F SUBSEQUENT PRENATAL CARE: CPT

## 2023-07-19 PROCEDURE — 76816 OB US FOLLOW-UP PER FETUS: CPT

## 2023-07-21 ENCOUNTER — INPATIENT (INPATIENT)
Facility: HOSPITAL | Age: 38
LOS: 0 days | Discharge: HOME | End: 2023-07-22
Attending: OBSTETRICS & GYNECOLOGY | Admitting: OBSTETRICS & GYNECOLOGY
Payer: COMMERCIAL

## 2023-07-21 ENCOUNTER — APPOINTMENT (OUTPATIENT)
Dept: OBGYN | Facility: CLINIC | Age: 38
End: 2023-07-21

## 2023-07-21 ENCOUNTER — NON-APPOINTMENT (OUTPATIENT)
Age: 38
End: 2023-07-21

## 2023-07-21 VITALS — SYSTOLIC BLOOD PRESSURE: 124 MMHG | TEMPERATURE: 98 F | DIASTOLIC BLOOD PRESSURE: 78 MMHG

## 2023-07-21 DIAGNOSIS — O09.513 SUPERVISION OF ELDERLY PRIMIGRAVIDA, THIRD TRIMESTER: ICD-10-CM

## 2023-07-21 DIAGNOSIS — Z90.3 ACQUIRED ABSENCE OF STOMACH [PART OF]: Chronic | ICD-10-CM

## 2023-07-21 DIAGNOSIS — Z90.49 ACQUIRED ABSENCE OF OTHER SPECIFIED PARTS OF DIGESTIVE TRACT: Chronic | ICD-10-CM

## 2023-07-21 DIAGNOSIS — Z90.89 ACQUIRED ABSENCE OF OTHER ORGANS: Chronic | ICD-10-CM

## 2023-07-21 DIAGNOSIS — N83.209 UNSPECIFIED OVARIAN CYST, UNSPECIFIED SIDE: Chronic | ICD-10-CM

## 2023-07-21 LAB
BASOPHILS # BLD AUTO: 0.02 K/UL — SIGNIFICANT CHANGE UP (ref 0–0.2)
BASOPHILS NFR BLD AUTO: 0.2 % — SIGNIFICANT CHANGE UP (ref 0–2)
COVID-19 SPIKE DOMAIN AB INTERP: POSITIVE
COVID-19 SPIKE DOMAIN ANTIBODY RESULT: >250 U/ML — HIGH
EOSINOPHIL # BLD AUTO: 0.07 K/UL — SIGNIFICANT CHANGE UP (ref 0–0.5)
EOSINOPHIL NFR BLD AUTO: 0.6 % — SIGNIFICANT CHANGE UP (ref 0–6)
HCT VFR BLD CALC: 37.6 % — SIGNIFICANT CHANGE UP (ref 34.5–45)
HGB BLD-MCNC: 13 G/DL — SIGNIFICANT CHANGE UP (ref 11.5–15.5)
IMM GRANULOCYTES NFR BLD AUTO: 0.5 % — SIGNIFICANT CHANGE UP (ref 0–0.9)
LYMPHOCYTES # BLD AUTO: 2.9 K/UL — SIGNIFICANT CHANGE UP (ref 1–3.3)
LYMPHOCYTES # BLD AUTO: 24.3 % — SIGNIFICANT CHANGE UP (ref 13–44)
MCHC RBC-ENTMCNC: 31 PG — SIGNIFICANT CHANGE UP (ref 27–34)
MCHC RBC-ENTMCNC: 34.6 GM/DL — SIGNIFICANT CHANGE UP (ref 32–36)
MCV RBC AUTO: 89.7 FL — SIGNIFICANT CHANGE UP (ref 80–100)
MONOCYTES # BLD AUTO: 0.79 K/UL — SIGNIFICANT CHANGE UP (ref 0–0.9)
MONOCYTES NFR BLD AUTO: 6.6 % — SIGNIFICANT CHANGE UP (ref 2–14)
NEUTROPHILS # BLD AUTO: 8.1 K/UL — HIGH (ref 1.8–7.4)
NEUTROPHILS NFR BLD AUTO: 67.8 % — SIGNIFICANT CHANGE UP (ref 43–77)
NRBC # BLD: 0 /100 WBCS — SIGNIFICANT CHANGE UP (ref 0–0)
PLATELET # BLD AUTO: 293 K/UL — SIGNIFICANT CHANGE UP (ref 150–400)
RBC # BLD: 4.19 M/UL — SIGNIFICANT CHANGE UP (ref 3.8–5.2)
RBC # FLD: 13.1 % — SIGNIFICANT CHANGE UP (ref 10.3–14.5)
SARS-COV-2 IGG+IGM SERPL QL IA: >250 U/ML — HIGH
SARS-COV-2 IGG+IGM SERPL QL IA: POSITIVE
T PALLIDUM AB TITR SER: NEGATIVE — SIGNIFICANT CHANGE UP
WBC # BLD: 11.94 K/UL — HIGH (ref 3.8–10.5)
WBC # FLD AUTO: 11.94 K/UL — HIGH (ref 3.8–10.5)

## 2023-07-21 PROCEDURE — 59400 OBSTETRICAL CARE: CPT

## 2023-07-21 RX ORDER — OXYCODONE HYDROCHLORIDE 5 MG/1
5 TABLET ORAL ONCE
Refills: 0 | Status: DISCONTINUED | OUTPATIENT
Start: 2023-07-21 | End: 2023-07-22

## 2023-07-21 RX ORDER — OXYTOCIN 10 UNIT/ML
333.33 VIAL (ML) INJECTION
Qty: 20 | Refills: 0 | Status: COMPLETED | OUTPATIENT
Start: 2023-07-21 | End: 2023-07-21

## 2023-07-21 RX ORDER — PRAMOXINE HYDROCHLORIDE 150 MG/15G
1 AEROSOL, FOAM RECTAL EVERY 4 HOURS
Refills: 0 | Status: DISCONTINUED | OUTPATIENT
Start: 2023-07-21 | End: 2023-07-22

## 2023-07-21 RX ORDER — SIMETHICONE 80 MG/1
80 TABLET, CHEWABLE ORAL EVERY 4 HOURS
Refills: 0 | Status: DISCONTINUED | OUTPATIENT
Start: 2023-07-21 | End: 2023-07-22

## 2023-07-21 RX ORDER — OXYCODONE HYDROCHLORIDE 5 MG/1
5 TABLET ORAL
Refills: 0 | Status: DISCONTINUED | OUTPATIENT
Start: 2023-07-21 | End: 2023-07-22

## 2023-07-21 RX ORDER — KETOROLAC TROMETHAMINE 30 MG/ML
30 SYRINGE (ML) INJECTION ONCE
Refills: 0 | Status: DISCONTINUED | OUTPATIENT
Start: 2023-07-21 | End: 2023-07-21

## 2023-07-21 RX ORDER — DIPHENHYDRAMINE HCL 50 MG
25 CAPSULE ORAL EVERY 6 HOURS
Refills: 0 | Status: DISCONTINUED | OUTPATIENT
Start: 2023-07-21 | End: 2023-07-22

## 2023-07-21 RX ORDER — OXYTOCIN 10 UNIT/ML
4 VIAL (ML) INJECTION
Qty: 30 | Refills: 0 | Status: DISCONTINUED | OUTPATIENT
Start: 2023-07-21 | End: 2023-07-21

## 2023-07-21 RX ORDER — IBUPROFEN 200 MG
600 TABLET ORAL EVERY 6 HOURS
Refills: 0 | Status: DISCONTINUED | OUTPATIENT
Start: 2023-07-21 | End: 2023-07-22

## 2023-07-21 RX ORDER — CHLORHEXIDINE GLUCONATE 213 G/1000ML
1 SOLUTION TOPICAL DAILY
Refills: 0 | Status: DISCONTINUED | OUTPATIENT
Start: 2023-07-21 | End: 2023-07-21

## 2023-07-21 RX ORDER — SODIUM CHLORIDE 9 MG/ML
1000 INJECTION, SOLUTION INTRAVENOUS
Refills: 0 | Status: DISCONTINUED | OUTPATIENT
Start: 2023-07-21 | End: 2023-07-21

## 2023-07-21 RX ORDER — HYDROCORTISONE 1 %
1 OINTMENT (GRAM) TOPICAL EVERY 6 HOURS
Refills: 0 | Status: DISCONTINUED | OUTPATIENT
Start: 2023-07-21 | End: 2023-07-22

## 2023-07-21 RX ORDER — AER TRAVELER 0.5 G/1
1 SOLUTION RECTAL; TOPICAL EVERY 4 HOURS
Refills: 0 | Status: DISCONTINUED | OUTPATIENT
Start: 2023-07-21 | End: 2023-07-22

## 2023-07-21 RX ORDER — LANOLIN
1 OINTMENT (GRAM) TOPICAL EVERY 6 HOURS
Refills: 0 | Status: DISCONTINUED | OUTPATIENT
Start: 2023-07-21 | End: 2023-07-22

## 2023-07-21 RX ORDER — TETANUS TOXOID, REDUCED DIPHTHERIA TOXOID AND ACELLULAR PERTUSSIS VACCINE, ADSORBED 5; 2.5; 8; 8; 2.5 [IU]/.5ML; [IU]/.5ML; UG/.5ML; UG/.5ML; UG/.5ML
0.5 SUSPENSION INTRAMUSCULAR ONCE
Refills: 0 | Status: DISCONTINUED | OUTPATIENT
Start: 2023-07-21 | End: 2023-07-22

## 2023-07-21 RX ORDER — SODIUM CHLORIDE 9 MG/ML
3 INJECTION INTRAMUSCULAR; INTRAVENOUS; SUBCUTANEOUS EVERY 8 HOURS
Refills: 0 | Status: DISCONTINUED | OUTPATIENT
Start: 2023-07-21 | End: 2023-07-22

## 2023-07-21 RX ORDER — OXYTOCIN 10 UNIT/ML
41.67 VIAL (ML) INJECTION
Qty: 20 | Refills: 0 | Status: DISCONTINUED | OUTPATIENT
Start: 2023-07-21 | End: 2023-07-22

## 2023-07-21 RX ORDER — DIBUCAINE 1 %
1 OINTMENT (GRAM) RECTAL EVERY 6 HOURS
Refills: 0 | Status: DISCONTINUED | OUTPATIENT
Start: 2023-07-21 | End: 2023-07-22

## 2023-07-21 RX ORDER — BENZOCAINE 10 %
1 GEL (GRAM) MUCOUS MEMBRANE EVERY 6 HOURS
Refills: 0 | Status: DISCONTINUED | OUTPATIENT
Start: 2023-07-21 | End: 2023-07-22

## 2023-07-21 RX ORDER — ACETAMINOPHEN 500 MG
975 TABLET ORAL
Refills: 0 | Status: DISCONTINUED | OUTPATIENT
Start: 2023-07-21 | End: 2023-07-22

## 2023-07-21 RX ORDER — MAGNESIUM HYDROXIDE 400 MG/1
30 TABLET, CHEWABLE ORAL
Refills: 0 | Status: DISCONTINUED | OUTPATIENT
Start: 2023-07-21 | End: 2023-07-22

## 2023-07-21 RX ORDER — IBUPROFEN 200 MG
600 TABLET ORAL EVERY 6 HOURS
Refills: 0 | Status: COMPLETED | OUTPATIENT
Start: 2023-07-21 | End: 2024-06-18

## 2023-07-21 RX ORDER — CITRIC ACID/SODIUM CITRATE 300-500 MG
15 SOLUTION, ORAL ORAL EVERY 6 HOURS
Refills: 0 | Status: DISCONTINUED | OUTPATIENT
Start: 2023-07-21 | End: 2023-07-21

## 2023-07-21 RX ADMIN — Medication 975 MILLIGRAM(S): at 21:45

## 2023-07-21 RX ADMIN — Medication 4 MILLIUNIT(S)/MIN: at 07:15

## 2023-07-21 RX ADMIN — Medication 1000 MILLIUNIT(S)/MIN: at 10:35

## 2023-07-21 RX ADMIN — SODIUM CHLORIDE 125 MILLILITER(S): 9 INJECTION, SOLUTION INTRAVENOUS at 05:50

## 2023-07-21 RX ADMIN — Medication 975 MILLIGRAM(S): at 21:15

## 2023-07-21 RX ADMIN — SODIUM CHLORIDE 125 MILLILITER(S): 9 INJECTION, SOLUTION INTRAVENOUS at 05:49

## 2023-07-21 RX ADMIN — Medication 30 MILLIGRAM(S): at 10:36

## 2023-07-21 NOTE — OB PROVIDER H&P - NSLOWPPHRISK_OBGYN_A_OB
No previous uterine incision/Lim Pregnancy/Less than or equal to 4 previous vaginal births/No known bleeding disorder/No history of postpartum hemorrhage/No other PPH risks indicated

## 2023-07-21 NOTE — OB RN PATIENT PROFILE - PARENTS VERBALIZED UNDERSTANDING OF THE SAFE SKIN TO SKIN POSITIONING OF THE NEWBORN.
patient's mother:  Preston Casper [[id number]]    has a past medical history of Chronic hypertension and Seizures ([facility] [address] Maternal GBS: negative  GC: Negative  HIV: Non reactive  Rubella Immune  HBSA: Negative  Hep C: Negative  RPR non reactive  Chlamydia: Negative  Trichomonas: negative     Pregnancy was uncomplicated. Mother received Stadol. There was not a maternal fever. DELIVERY    Information for the patient's mother:  Preston Casper [[id number]]      Mother   Information for the patient's mother:  Preston Casper [[id number]]    has a past medical history of Chronic hypertension and Seizures ([facility] Anesthesia was not used. Mother received narcotics <30 minutes prior to delivery.  Information:   After initially doing well in the DR, infant developed secondary apnea, requiring PPV. Transitioned to CPAP 5/30% and brought to the [facility] for further care. Feeding Method Used: Bottle    Hospital Course:     Patient was monitored and weaned from CPAP over the subsequent 12 hours after delivery. NPO initially, was allowed to PO feed once off CPAP. Patient was treated with antibiotics for 24 hours, and then monitored for another 24 hours off. After which time he was deemed stable for discharge, eating well without difficulties, tolerating room air. Pregnancy history, family history, and nursing notes reviewed.     PHYSICAL EXAM    Vitals:  BP 63/40   Pulse 148   Temp 98.3 °F (36.8 °C)   Resp 42   Ht 50.8 cm Comment: Filed from Delivery Summary  Wt 2950 g Comment: 6-8  HC 13\" (33 cm) Comment: Filed from Delivery Summary  SpO2 98%   BMI 11.43 kg/m²  I Head Circumference: 13\" (33 cm) (Filed from Delivery Summary)    Mean Artery Pressure:  MAP (mmHg): (!) 47    GENERAL:  active and reactive for age, non-dysmorphic  HEAD:  normocephalic, anterior fontanel is open, soft and flat, anterior fontanel is soft  EYES:  lids open, eyes clear without drainage  EARS:  normally set  NOSE:  nares Statement Selected

## 2023-07-21 NOTE — OB PROVIDER H&P - NSHPPHYSICALEXAM_GEN_ALL_CORE
Vital Signs Last 24 Hrs  T(C): 36.9 (21 Jul 2023 05:07), Max: 36.9 (21 Jul 2023 04:55)  T(F): 98.4 (21 Jul 2023 05:07), Max: 98.42 (21 Jul 2023 04:55)  HR: 83 (21 Jul 2023 05:21) (82 - 99)  BP: 124/78 (21 Jul 2023 05:07) (124/78 - 124/78)  BP(mean): --  RR: 18 (21 Jul 2023 05:07) (18 - 18)  SpO2: 93% (21 Jul 2023 05:21) (89% - 100%)

## 2023-07-21 NOTE — OB RN DELIVERY SUMMARY - NSSELHIDDEN_OBGYN_ALL_OB_FT
[NS_DeliveryAttending1_OBGYN_ALL_OB_FT:[id number]],[NS_DeliveryRN_OBGYN_ALL_OB_FT:[id number]==]

## 2023-07-21 NOTE — OB PROVIDER H&P - NSICDXPASTSURGICALHX_GEN_ALL_CORE_FT
PAST SURGICAL HISTORY:  H/O gastric sleeve     History of tonsillectomy childhood    Ovarian cyst ' 19 Removal of cyst: Right    S/P laparoscopic cholecystectomy 2017

## 2023-07-21 NOTE — OB PROVIDER DELIVERY SUMMARY - NSPROVIDERDELIVERYNOTE_OBGYN_ALL_OB_FT
Pt fully dilated and pushing.   viable male baby in OA position over intact perineum.  head delivered easily.  loose nuchal cord note and infant delivered through.  shoulders and body delivered easily.  infant handed to awaiting mother.  nose and mouth suctioned by bulb suction.  delayed cord clamping performed.  spontaneous delivery of intact placenta.  on inspection, cervix and rectum intact.  second degree perineal laceration repaired in usual sterile fashion with 2-0 and 3-0 chromic suture.  hemostasis noted.  mother and baby to recovery in good condition.    EBL 250mL    Elsie Patel MD

## 2023-07-21 NOTE — OB PROVIDER H&P - ASSESSMENT
38yo  @40w p/w eIOL  - admit to L&D  - for pitocin at 7a  - GBS neg  - EFW 3888  - Routine Labs  - FHT/TOCO  - Anesthesia Consult  - Anticipate      Leah Bran, PGY2  d/w Dr. Goetz - - - - - -

## 2023-07-21 NOTE — OB RN DELIVERY SUMMARY - NS_SEPSISRSKCALC_OBGYN_ALL_OB_FT
EOS calculated successfully. EOS Risk Factor: 0.5/1000 live births ([facility] national incidence); GA=40w;Temp=98.8; ROM=0.633; GBS='Negative'; Antibiotics='No antibiotics or any antibiotics < 2 hrs prior to birth'

## 2023-07-21 NOTE — OB PROVIDER H&P - HISTORY OF PRESENT ILLNESS
38yo  @40w p/f eIOL. +intermittent CTX, –VB, -LOF, +FM. +Mild HA. Pt denies fever, chills, nausea, vomiting, diarrhea, constipation, dizziness, syncope, chest pain, palpitations, shortness of breath, dysuria, urgency, frequency.    PNC: unc  GBS: neg  EFW: 3888    OBHx:    FT , M, 5lbs, c/b GDM and gHTN  2018 SAB s/p D&C   2022   GynHx: h/o PCOS, abnormal Paps and ovarian cysts. Denies STI or fibroids   PMH: obesity   PSH: tonsillectomy, LSC ovarian cystectomy ,  LSC cholecystectomy,  gastric sleeve  Meds: PNV, bASA, Vit D  Allergies: NKDA  Social: denies alcohol/tobacco/drug use in pregnancy   Psych: denies anxiety/depression

## 2023-07-21 NOTE — OB RN PATIENT PROFILE - BRADEN SCORE (IF 18 OR LESS ACTIVATE SKIN INJURY RISK INCREASED GUIDELINE), MLM
23 [Follow-Up] : a follow-up visit [FreeTextEntry1] : asthmatic with hypereos syndrome treated with fasenra

## 2023-07-22 ENCOUNTER — TRANSCRIPTION ENCOUNTER (OUTPATIENT)
Age: 38
End: 2023-07-22

## 2023-07-22 VITALS
TEMPERATURE: 98 F | OXYGEN SATURATION: 98 % | DIASTOLIC BLOOD PRESSURE: 71 MMHG | SYSTOLIC BLOOD PRESSURE: 105 MMHG | RESPIRATION RATE: 18 BRPM | HEART RATE: 69 BPM

## 2023-07-22 PROCEDURE — 85025 COMPLETE CBC W/AUTO DIFF WBC: CPT

## 2023-07-22 PROCEDURE — 86780 TREPONEMA PALLIDUM: CPT

## 2023-07-22 PROCEDURE — 86900 BLOOD TYPING SEROLOGIC ABO: CPT

## 2023-07-22 PROCEDURE — 86901 BLOOD TYPING SEROLOGIC RH(D): CPT

## 2023-07-22 PROCEDURE — 86769 SARS-COV-2 COVID-19 ANTIBODY: CPT

## 2023-07-22 PROCEDURE — 59050 FETAL MONITOR W/REPORT: CPT

## 2023-07-22 PROCEDURE — 86850 RBC ANTIBODY SCREEN: CPT

## 2023-07-22 RX ORDER — BENZOCAINE 10 %
1 GEL (GRAM) MUCOUS MEMBRANE
Qty: 0 | Refills: 0 | DISCHARGE
Start: 2023-07-22

## 2023-07-22 RX ORDER — DIBUCAINE 1 %
1 OINTMENT (GRAM) RECTAL
Qty: 0 | Refills: 0 | DISCHARGE
Start: 2023-07-22

## 2023-07-22 RX ORDER — IBUPROFEN 200 MG
1 TABLET ORAL
Qty: 0 | Refills: 0 | DISCHARGE
Start: 2023-07-22

## 2023-07-22 RX ORDER — ACETAMINOPHEN 500 MG
3 TABLET ORAL
Qty: 0 | Refills: 0 | DISCHARGE
Start: 2023-07-22

## 2023-07-22 RX ADMIN — Medication 600 MILLIGRAM(S): at 00:08

## 2023-07-22 RX ADMIN — Medication 975 MILLIGRAM(S): at 09:10

## 2023-07-22 RX ADMIN — Medication 600 MILLIGRAM(S): at 00:40

## 2023-07-22 RX ADMIN — Medication 975 MILLIGRAM(S): at 03:30

## 2023-07-22 RX ADMIN — Medication 600 MILLIGRAM(S): at 06:05

## 2023-07-22 RX ADMIN — Medication 600 MILLIGRAM(S): at 06:35

## 2023-07-22 RX ADMIN — Medication 975 MILLIGRAM(S): at 08:07

## 2023-07-22 RX ADMIN — Medication 975 MILLIGRAM(S): at 03:00

## 2023-07-22 NOTE — DISCHARGE NOTE OB - CARE PROVIDER_API CALL
Elsie Patel  Obstetrics and Gynecology  [address] [place] [address], [address]  [place] [address]  Phone: [phone]  Fax: [phone]  Follow Up Time:

## 2023-07-22 NOTE — PROGRESS NOTE ADULT - ASSESSMENT
A/P:  37y  PPD # 1      S/P           doing well      Current Issues: Urinary incontinence, Left leg weakness  PAST MEDICAL & SURGICAL HISTORY:  Gallstones  '17      Obesity (BMI 30-39.9)      Ovarian cyst  age 18  Right       (normal spontaneous vaginal delivery)  '16      Ovarian cyst  ' 19 Removal of cyst: Right      History of tonsillectomy  childhood      S/P laparoscopic cholecystectomy        H/O gastric sleeve     A/P:  37y  PPD # 1      S/P           doing well      Current Issues: none  PAST MEDICAL & SURGICAL HISTORY:  Gallstones  '17      Obesity (BMI 30-39.9)      Ovarian cyst  age 18  Right       (normal spontaneous vaginal delivery)  '16      Ovarian cyst  ' 19 Removal of cyst: Right      History of tonsillectomy  childhood      S/P laparoscopic cholecystectomy        H/O gastric sleeve

## 2023-07-22 NOTE — DISCHARGE NOTE OB - MEDICATION SUMMARY - MEDICATIONS TO TAKE
I will START or STAY ON the medications listed below when I get home from the hospital:    ibuprofen 600 mg oral tablet  -- 1 tab(s) by mouth every 6 hours  -- Indication: For moderate pain    acetaminophen 325 mg oral tablet  -- 3 tab(s) by mouth every 6 hours as needed for  mild pain  -- Indication: For mild pain    benzocaine 20% topical spray  -- 1 Apply on skin to affected area every 6 hours As needed for Perineal discomfort  -- Indication: For perineal pain    dibucaine 1% topical ointment  -- 1 Apply on skin to affected area every 6 hours As needed Perineal discomfort  -- Indication: For perineal pain    Prenatal Multivitamins with Folic Acid 1 mg oral tablet  -- 1 tab(s) by mouth once a day  -- Indication: For nutrition

## 2023-07-22 NOTE — PROGRESS NOTE ADULT - PROBLEM SELECTOR PLAN 1
Increase OOB  Regular diet  PO Pain protocol  Urinary incontinence -   Left leg weakness -   Routine Postpartum Care      Radha Almonte PA-C - Continue routine PP care  - Continue PO pain medication  - OOB  - reg diet  Radha Almonte PA-C

## 2023-07-22 NOTE — PROGRESS NOTE ADULT - NS ATTEND AMEND GEN_ALL_CORE FT
36yo            PPD#1         S/P   doing well  routine postpartum care  discharge  home    Elsie Patel MD
IV discontinued, cath removed intact

## 2023-07-22 NOTE — DISCHARGE NOTE OB - NS MD DC FALL RISK RISK
For information on Fall & Injury Prevention, visit: [email or web address] OR  [email or web address] OR  https://www.cdc.gov/steadi/patient.html

## 2023-07-22 NOTE — PROGRESS NOTE ADULT - SUBJECTIVE AND OBJECTIVE BOX
Postpartum Note- PPD#1    Allergies    No Known Allergies    Intolerances      Blood Type B   Positive    RPR : Negative    Rubella: Immune    S: Patient is a  37oy            PPD#1         S/P    Pt c/o urinary incontinence and left leg feeling weak.    Pt states when she got up to void she was unable to hold her urine, also she states during pregnancy she had left leg sciatica with numbess and leg weakness.  States this is slightly improving but still present.  Pt is OOB, tolerating PO, passing flatus. Lochia WNL.     Feeding: Breast    O:  Vital Signs Last 24 Hrs  T(C): 36.8 (2023 05:51), Max: 37.1 (2023 10:10)  T(F): 98.2 (2023 05:51), Max: 98.8 (2023 10:10)  HR: 69 (2023 05:51) (64 - 112)  BP: 105/71 (2023 05:51) (100/65 - 125/88)  BP(mean): 77 (2023 12:26) (76 - 82)  RR: 18 (2023 05:51) (15 - 18)  SpO2: 98% (2023 05:51) (93% - 99%)       Gen: NAD  Abdomen: Soft, nontender, non-distended, fundus firm.  Vaginal: Lochia WNL  Ext: Neg calf tenderness    LABS:    Hemoglobin: 13.0 g/dL ( @ 05:59)      Hematocrit: 37.6 % ( @ 05:59)                   Postpartum Note- PPD#1    Allergies    No Known Allergies    Intolerances      Blood Type B   Positive    RPR : Negative    Rubella: Immune    S: Patient is a  38yo            PPD#1         S/P    Pt denies complaints, doing well  Pt is OOB, tolerating PO, passing flatus. Lochia WNL.     Feeding: Breast    O:  Vital Signs Last 24 Hrs  T(C): 36.8 (2023 05:51), Max: 37.1 (2023 10:10)  T(F): 98.2 (2023 05:51), Max: 98.8 (2023 10:10)  HR: 69 (2023 05:51) (64 - 112)  BP: 105/71 (2023 05:51) (100/65 - 125/88)  BP(mean): 77 (2023 12:26) (76 - 82)  RR: 18 (2023 05:51) (15 - 18)  SpO2: 98% (2023 05:51) (93% - 99%)       Gen: NAD  Abdomen: Soft, nontender, non-distended, fundus firm.  Vaginal: Lochia WNL  Ext: Neg calf tenderness    LABS:    Hemoglobin: 13.0 g/dL ( @ 05:59)      Hematocrit: 37.6 % ( @ 05:59)

## 2023-07-22 NOTE — DISCHARGE NOTE OB - CARE PLAN
1 Principal Discharge DX:	Vaginal delivery  Assessment and plan of treatment:	nothing in the vagina (no sex, tampons, swimming, tub baths), follow up in office in 6 weeks

## 2023-07-22 NOTE — DISCHARGE NOTE OB - PATIENT PORTAL LINK FT
You can access the FollowMyHealth Patient Portal offered by [facility] by registering at the following website: [email or web address]. By joining [facility]’s FollowMyHealth portal, you will also be able to view your health information using other applications (apps) compatible with our system.

## 2023-07-22 NOTE — DISCHARGE NOTE OB - HOSPITAL COURSE
36yo P1 presented at 40 weeks for induction of labor, delivered by , uncomplicated postpartum course, discharged home PPD#1

## 2023-08-01 ENCOUNTER — EMERGENCY (EMERGENCY)
Facility: HOSPITAL | Age: 38
LOS: 1 days | Discharge: HOME | End: 2023-08-01
Attending: EMERGENCY MEDICINE
Payer: COMMERCIAL

## 2023-08-01 VITALS
SYSTOLIC BLOOD PRESSURE: 115 MMHG | OXYGEN SATURATION: 96 % | DIASTOLIC BLOOD PRESSURE: 81 MMHG | RESPIRATION RATE: 18 BRPM | HEIGHT: 68 IN | WEIGHT: 182.98 LBS | HEART RATE: 94 BPM | TEMPERATURE: 98 F

## 2023-08-01 DIAGNOSIS — Z90.49 ACQUIRED ABSENCE OF OTHER SPECIFIED PARTS OF DIGESTIVE TRACT: Chronic | ICD-10-CM

## 2023-08-01 DIAGNOSIS — N83.209 UNSPECIFIED OVARIAN CYST, UNSPECIFIED SIDE: Chronic | ICD-10-CM

## 2023-08-01 DIAGNOSIS — Z90.3 ACQUIRED ABSENCE OF STOMACH [PART OF]: Chronic | ICD-10-CM

## 2023-08-01 DIAGNOSIS — Z90.89 ACQUIRED ABSENCE OF OTHER ORGANS: Chronic | ICD-10-CM

## 2023-08-01 PROCEDURE — 99283 EMERGENCY DEPT VISIT LOW MDM: CPT

## 2023-08-01 PROCEDURE — 99282 EMERGENCY DEPT VISIT SF MDM: CPT

## 2023-08-02 ENCOUNTER — NON-APPOINTMENT (OUTPATIENT)
Age: 38
End: 2023-08-02

## 2023-08-02 DIAGNOSIS — N61.0 MASTITIS WITHOUT ABSCESS: ICD-10-CM

## 2023-08-02 RX ORDER — ACETAMINOPHEN 500 MG
650 TABLET ORAL ONCE
Refills: 0 | Status: DISCONTINUED | OUTPATIENT
Start: 2023-08-02 | End: 2023-08-05

## 2023-08-02 RX ORDER — DICLOXACILLIN SODIUM 500 MG/1
500 CAPSULE ORAL 4 TIMES DAILY
Qty: 40 | Refills: 0 | Status: ACTIVE | COMMUNITY
Start: 2023-08-02 | End: 1900-01-01

## 2023-08-02 NOTE — ED PROVIDER NOTE - CLINICAL SUMMARY MEDICAL DECISION MAKING FREE TEXT BOX
Benson LUNDBERG: Pt left ED prior to my full evaluation  Because pt had initial encounter with tele- doctor, pt eloped.

## 2023-08-02 NOTE — ED PROVIDER NOTE - RAPID ASSESSMENT
37y F 12d s/p , actively lactating, now p/w 4d chills w/ R>L breast pain. No redness or focal swelling.   No VS abnornalities but she appears uncomfortable. I can't evaluate/examine breasts via telemediicne, & I have noting on VS to suggest a sepsis picture at this time  I ordered tylenol but he would need a full exam to determine further tx options.

## 2023-08-03 ENCOUNTER — INPATIENT (INPATIENT)
Facility: HOSPITAL | Age: 38
LOS: 2 days | Discharge: HOME | DRG: 776 | End: 2023-08-06
Attending: OBSTETRICS & GYNECOLOGY | Admitting: OBSTETRICS & GYNECOLOGY
Payer: COMMERCIAL

## 2023-08-03 VITALS
OXYGEN SATURATION: 100 % | RESPIRATION RATE: 19 BRPM | TEMPERATURE: 102 F | WEIGHT: 199.96 LBS | DIASTOLIC BLOOD PRESSURE: 81 MMHG | HEART RATE: 104 BPM | SYSTOLIC BLOOD PRESSURE: 113 MMHG | HEIGHT: 68 IN

## 2023-08-03 DIAGNOSIS — N61.0 MASTITIS WITHOUT ABSCESS: ICD-10-CM

## 2023-08-03 DIAGNOSIS — Z90.49 ACQUIRED ABSENCE OF OTHER SPECIFIED PARTS OF DIGESTIVE TRACT: Chronic | ICD-10-CM

## 2023-08-03 DIAGNOSIS — Z90.89 ACQUIRED ABSENCE OF OTHER ORGANS: Chronic | ICD-10-CM

## 2023-08-03 DIAGNOSIS — Z90.3 ACQUIRED ABSENCE OF STOMACH [PART OF]: Chronic | ICD-10-CM

## 2023-08-03 DIAGNOSIS — N83.209 UNSPECIFIED OVARIAN CYST, UNSPECIFIED SIDE: Chronic | ICD-10-CM

## 2023-08-03 LAB
ALBUMIN SERPL ELPH-MCNC: 3.6 G/DL — SIGNIFICANT CHANGE UP (ref 3.3–5)
ALP SERPL-CCNC: 100 U/L — SIGNIFICANT CHANGE UP (ref 40–120)
ALT FLD-CCNC: 13 U/L — SIGNIFICANT CHANGE UP (ref 10–45)
ANION GAP SERPL CALC-SCNC: 14 MMOL/L — SIGNIFICANT CHANGE UP (ref 5–17)
APTT BLD: 32.6 SEC — SIGNIFICANT CHANGE UP (ref 24.5–35.6)
AST SERPL-CCNC: 14 U/L — SIGNIFICANT CHANGE UP (ref 10–40)
BASE EXCESS BLDV CALC-SCNC: 0.7 MMOL/L — SIGNIFICANT CHANGE UP (ref -2–3)
BASOPHILS # BLD AUTO: 0.03 K/UL — SIGNIFICANT CHANGE UP (ref 0–0.2)
BASOPHILS NFR BLD AUTO: 0.2 % — SIGNIFICANT CHANGE UP (ref 0–2)
BILIRUB SERPL-MCNC: 0.3 MG/DL — SIGNIFICANT CHANGE UP (ref 0.2–1.2)
BUN SERPL-MCNC: 11 MG/DL — SIGNIFICANT CHANGE UP (ref 7–23)
CA-I SERPL-SCNC: 1.21 MMOL/L — SIGNIFICANT CHANGE UP (ref 1.15–1.33)
CALCIUM SERPL-MCNC: 9.1 MG/DL — SIGNIFICANT CHANGE UP (ref 8.4–10.5)
CHLORIDE BLDV-SCNC: 103 MMOL/L — SIGNIFICANT CHANGE UP (ref 96–108)
CHLORIDE SERPL-SCNC: 103 MMOL/L — SIGNIFICANT CHANGE UP (ref 96–108)
CO2 BLDV-SCNC: 27 MMOL/L — HIGH (ref 22–26)
CO2 SERPL-SCNC: 20 MMOL/L — LOW (ref 22–31)
CREAT SERPL-MCNC: 0.76 MG/DL — SIGNIFICANT CHANGE UP (ref 0.5–1.3)
EGFR: 105 ML/MIN/1.73M2 — SIGNIFICANT CHANGE UP
EOSINOPHIL # BLD AUTO: 0.12 K/UL — SIGNIFICANT CHANGE UP (ref 0–0.5)
EOSINOPHIL NFR BLD AUTO: 0.6 % — SIGNIFICANT CHANGE UP (ref 0–6)
GAS PNL BLDV: 134 MMOL/L — LOW (ref 136–145)
GAS PNL BLDV: SIGNIFICANT CHANGE UP
GAS PNL BLDV: SIGNIFICANT CHANGE UP
GLUCOSE BLDV-MCNC: 132 MG/DL — HIGH (ref 70–99)
GLUCOSE SERPL-MCNC: 137 MG/DL — HIGH (ref 70–99)
HCO3 BLDV-SCNC: 25 MMOL/L — SIGNIFICANT CHANGE UP (ref 22–29)
HCT VFR BLD CALC: 39 % — SIGNIFICANT CHANGE UP (ref 34.5–45)
HCT VFR BLDA CALC: 41 % — SIGNIFICANT CHANGE UP (ref 34.5–46.5)
HGB BLD CALC-MCNC: 13.6 G/DL — SIGNIFICANT CHANGE UP (ref 11.7–16.1)
HGB BLD-MCNC: 13 G/DL — SIGNIFICANT CHANGE UP (ref 11.5–15.5)
IMM GRANULOCYTES NFR BLD AUTO: 0.9 % — SIGNIFICANT CHANGE UP (ref 0–0.9)
INR BLD: 1.15 RATIO — SIGNIFICANT CHANGE UP (ref 0.85–1.18)
LACTATE BLDV-MCNC: 1.7 MMOL/L — SIGNIFICANT CHANGE UP (ref 0.5–2)
LYMPHOCYTES # BLD AUTO: 1.43 K/UL — SIGNIFICANT CHANGE UP (ref 1–3.3)
LYMPHOCYTES # BLD AUTO: 7.3 % — LOW (ref 13–44)
MCHC RBC-ENTMCNC: 30.1 PG — SIGNIFICANT CHANGE UP (ref 27–34)
MCHC RBC-ENTMCNC: 33.3 GM/DL — SIGNIFICANT CHANGE UP (ref 32–36)
MCV RBC AUTO: 90.3 FL — SIGNIFICANT CHANGE UP (ref 80–100)
MONOCYTES # BLD AUTO: 0.55 K/UL — SIGNIFICANT CHANGE UP (ref 0–0.9)
MONOCYTES NFR BLD AUTO: 2.8 % — SIGNIFICANT CHANGE UP (ref 2–14)
NEUTROPHILS # BLD AUTO: 17.26 K/UL — HIGH (ref 1.8–7.4)
NEUTROPHILS NFR BLD AUTO: 88.2 % — HIGH (ref 43–77)
NRBC # BLD: 0 /100 WBCS — SIGNIFICANT CHANGE UP (ref 0–0)
PCO2 BLDV: 40 MMHG — SIGNIFICANT CHANGE UP (ref 39–42)
PH BLDV: 7.41 — SIGNIFICANT CHANGE UP (ref 7.32–7.43)
PLATELET # BLD AUTO: 297 K/UL — SIGNIFICANT CHANGE UP (ref 150–400)
PO2 BLDV: 39 MMHG — SIGNIFICANT CHANGE UP (ref 25–45)
POTASSIUM BLDV-SCNC: 4 MMOL/L — SIGNIFICANT CHANGE UP (ref 3.5–5.1)
POTASSIUM SERPL-MCNC: 4 MMOL/L — SIGNIFICANT CHANGE UP (ref 3.5–5.3)
POTASSIUM SERPL-SCNC: 4 MMOL/L — SIGNIFICANT CHANGE UP (ref 3.5–5.3)
PROT SERPL-MCNC: 7 G/DL — SIGNIFICANT CHANGE UP (ref 6–8.3)
PROTHROM AB SERPL-ACNC: 12.6 SEC — SIGNIFICANT CHANGE UP (ref 9.5–13)
RBC # BLD: 4.32 M/UL — SIGNIFICANT CHANGE UP (ref 3.8–5.2)
RBC # FLD: 12.8 % — SIGNIFICANT CHANGE UP (ref 10.3–14.5)
SAO2 % BLDV: 61.9 % — LOW (ref 67–88)
SODIUM SERPL-SCNC: 137 MMOL/L — SIGNIFICANT CHANGE UP (ref 135–145)
WBC # BLD: 19.57 K/UL — HIGH (ref 3.8–10.5)
WBC # FLD AUTO: 19.57 K/UL — HIGH (ref 3.8–10.5)

## 2023-08-03 PROCEDURE — 76642 ULTRASOUND BREAST LIMITED: CPT | Mod: 26,RT

## 2023-08-03 PROCEDURE — 99285 EMERGENCY DEPT VISIT HI MDM: CPT

## 2023-08-03 RX ORDER — HEPARIN SODIUM 5000 [USP'U]/ML
5000 INJECTION INTRAVENOUS; SUBCUTANEOUS EVERY 12 HOURS
Refills: 0 | Status: DISCONTINUED | OUTPATIENT
Start: 2023-08-03 | End: 2023-08-06

## 2023-08-03 RX ORDER — ACETAMINOPHEN 500 MG
1000 TABLET ORAL ONCE
Refills: 0 | Status: COMPLETED | OUTPATIENT
Start: 2023-08-03 | End: 2023-08-03

## 2023-08-03 RX ORDER — IBUPROFEN 200 MG
600 TABLET ORAL EVERY 6 HOURS
Refills: 0 | Status: DISCONTINUED | OUTPATIENT
Start: 2023-08-03 | End: 2023-08-06

## 2023-08-03 RX ORDER — SODIUM CHLORIDE 9 MG/ML
2800 INJECTION INTRAMUSCULAR; INTRAVENOUS; SUBCUTANEOUS ONCE
Refills: 0 | Status: COMPLETED | OUTPATIENT
Start: 2023-08-03 | End: 2023-08-03

## 2023-08-03 RX ORDER — VANCOMYCIN HCL 1 G
1250 VIAL (EA) INTRAVENOUS EVERY 12 HOURS
Refills: 0 | Status: DISCONTINUED | OUTPATIENT
Start: 2023-08-03 | End: 2023-08-04

## 2023-08-03 RX ORDER — ACETAMINOPHEN 500 MG
975 TABLET ORAL EVERY 6 HOURS
Refills: 0 | Status: DISCONTINUED | OUTPATIENT
Start: 2023-08-03 | End: 2023-08-06

## 2023-08-03 RX ORDER — IBUPROFEN 200 MG
600 TABLET ORAL EVERY 6 HOURS
Refills: 0 | Status: DISCONTINUED | OUTPATIENT
Start: 2023-08-03 | End: 2023-08-03

## 2023-08-03 RX ADMIN — Medication 400 MILLIGRAM(S): at 15:53

## 2023-08-03 RX ADMIN — Medication 1000 MILLIGRAM(S): at 16:01

## 2023-08-03 RX ADMIN — Medication 1000 MILLIGRAM(S): at 22:50

## 2023-08-03 RX ADMIN — Medication 166.67 MILLIGRAM(S): at 23:20

## 2023-08-03 RX ADMIN — Medication 100 MILLIGRAM(S): at 15:54

## 2023-08-03 RX ADMIN — SODIUM CHLORIDE 2800 MILLILITER(S): 9 INJECTION INTRAMUSCULAR; INTRAVENOUS; SUBCUTANEOUS at 15:45

## 2023-08-03 RX ADMIN — SODIUM CHLORIDE 2800 MILLILITER(S): 9 INJECTION INTRAMUSCULAR; INTRAVENOUS; SUBCUTANEOUS at 16:01

## 2023-08-03 RX ADMIN — Medication 600 MILLIGRAM(S): at 16:01

## 2023-08-03 RX ADMIN — Medication 400 MILLIGRAM(S): at 21:39

## 2023-08-03 NOTE — H&P ADULT - NSHPPHYSICALEXAM_GEN_ALL_CORE
Vital Signs Last 24 Hrs  T(C): 37.2 (03 Aug 2023 18:02), Max: 38.8 (03 Aug 2023 14:57)  T(F): 99 (03 Aug 2023 18:02), Max: 101.8 (03 Aug 2023 14:57)  HR: 87 (03 Aug 2023 18:02) (87 - 104)  BP: 115/79 (03 Aug 2023 18:02) (113/81 - 115/79)  BP(mean): --  RR: 19 (03 Aug 2023 14:57) (19 - 19)  SpO2: 99% (03 Aug 2023 18:02) (99% - 100%)    Parameters below as of 03 Aug 2023 14:57  Patient On (Oxygen Delivery Method): room air        Physical Exam:   General: sitting comftorably in bed, NAD   Breast: breasts engorged bilaterally, R breast with erythema of the upper outer corner warm to palpation, breasts with bilateral diffuse tenderness to palpation  Abd: Soft, non-tender, non-distended  Ext: non-tender b/l, no edema

## 2023-08-03 NOTE — H&P ADULT - NSHPLABSRESULTS_GEN_ALL_CORE
LABS:                              13.0   19.57 )-----------( 297      ( 03 Aug 2023 15:50 )             39.0     08-03    137  |  103  |  11  ----------------------------<  137<H>  4.0   |  20<L>  |  0.76    Ca    9.1      03 Aug 2023 15:50    TPro  7.0  /  Alb  3.6  /  TBili  0.3  /  DBili  x   /  AST  14  /  ALT  13  /  AlkPhos  100  08-03    I&O's Detail    PT/INR - ( 03 Aug 2023 15:50 )   PT: 12.6 sec;   INR: 1.15 ratio         PTT - ( 03 Aug 2023 15:50 )  PTT:32.6 sec  Urinalysis Basic - ( 03 Aug 2023 15:50 )    Color: x / Appearance: x / SG: x / pH: x  Gluc: 137 mg/dL / Ketone: x  / Bili: x / Urobili: x   Blood: x / Protein: x / Nitrite: x   Leuk Esterase: x / RBC: x / WBC x   Sq Epi: x / Non Sq Epi: x / Bacteria: x        RADIOLOGY & ADDITIONAL STUDIES:    Breast US(8/3):  FINDINGS:    In the area of concern in the right breast upper outer quadrant, there is   soft tissue edema and hyperemia with dilated ducts.  No discrete fluid collection.    IMPRESSION:  No sonographic evidence of breast abscess.  Clinical management of mastitis is recommended.    If symptoms do not resolve clinically, additional imaging in a dedicated   breast imaging center should be performed to exclude the possibility of   malignancy.

## 2023-08-03 NOTE — ED PROVIDER NOTE - WET READ LAUNCH FT
How Severe Is Your Skin Lesion?: moderate
Have Your Skin Lesions Been Treated?: not been treated
Is This A New Presentation, Or A Follow-Up?: Skin Lesions
There are no Wet Read(s) to document.

## 2023-08-03 NOTE — H&P ADULT - ASSESSMENT
36yo  PPD#13  presenting with fevers and breast tenderness and erythema. Patient was febrile to 38.8 in the ED. Tachycardia to 104 is now resolved. WBC 19.57. Breast ultrasound negative for abscess. Patient is clinically stable. She will be admitted for IV antibiotics due to failed outpatient antibiotics    Neuro: Tynelol and Motrin PRN for pain  CV: Hemodynamically stable. Monitor VS. AM CBC  Pulm: Saturating well on room air  GI: Regular diet  : Voiding spontaneously  ID: Patient admitted for mastitis  - s/p Clindamycin x1 (8/3)  - ID consult for IV antibiotic management   - f/u BCx/Ucx (8/3)  - Encourage regular breast pumping while inpatient  FEN: SLIV  Heme: HSQ for DVT PPX  Dispo: Inpatient    Leah Bran, PGY2  d/w Dr. Goetz 36yo  PPD#13  presenting with fevers and breast tenderness and erythema. Patient was febrile to 38.8 in the ED. Tachycardia to 104 is now resolved. WBC 19.57. Breast ultrasound negative for abscess. Patient is clinically stable. She will be admitted for IV antibiotics due to failed outpatient antibiotics    Neuro: Tynelol and Motrin PRN for pain  CV: Hemodynamically stable. Monitor VS. AM CBC  Pulm: Saturating well on room air  GI: Regular diet  : Voiding spontaneously  ID: Patient admitted for mastitis  - s/p Clindamycin x1 (8/3)  - ID preliminary recommendations vancomycin 1250 q12h. ID to see for full consult in AM.  - f/u BCx/Ucx (8/3)  - Encourage regular breast pumping while inpatient  FEN: SLIV  Heme: HSQ for DVT PPX  Dispo: Inpatient    Leah Bran, PGY2  d/w Dr. Goetz

## 2023-08-03 NOTE — ED PROVIDER NOTE - OBJECTIVE STATEMENT
37 y F, no PMH, recent vaginal delivery on 07/21 with no complications, breast-feeding, now p/w 4-days onset of R breast pain, fever. No discharge/drainage. Pain radiating to the axilla. Been taking tylenol with minimal relief. Was started on Dicloxacillin by her OB provider yesterday. Denies cough, congestion, chest pain, shortness of breath, abdominal pain, vaginal discharge, vaginal bleeding, dysuria.

## 2023-08-03 NOTE — ED ADULT TRIAGE NOTE - CHIEF COMPLAINT QUOTE
post partum July 21 vaginal birth. Right breast pain and redness x2 weeks, worsening over last 4 days, now b/l breast pain. Denies drainage.

## 2023-08-03 NOTE — ED PROVIDER NOTE - PHYSICAL EXAMINATION
Gen: Patient is well-appearing, NAD, AAOx3, able to ambulate without assistance  HEENT: NCAT, normal conjunctiva, tongue midline, oral mucosa moist  Breast: R breast is mildly erythematous with small palpable nodule at 9 o'clock position, no discharge/drainage noted, L breast exam is normal (chaperoned by Alysha Gustafson RN)  Lung: CTAB, no respiratory distress, no wheezes/rhonchi/rales B/L, speaking in full sentences  CV: tachycardic, no murmurs, rubs or gallops, distal pulses 2+ b/l  Abd: soft, NT, ND, no guarding, no rigidity, no rebound tenderness  MSK: no visible deformities, ROM normal in UE/LE  Neuro: No focal sensory or motor deficits  Skin: Warm, well perfused, no rash, no leg swelling  Psych: normal affect, calm

## 2023-08-03 NOTE — ED ADULT NURSE NOTE - OBJECTIVE STATEMENT
37 yr old female who gave vaginal birth 2 weeks ago came in with mastitis on both breasts. on assessment a and o x 3 lungs clear abd soft non tender no swelling in extremities no n/v/d has fevers, both breasts are very tender red on the sides and hard by the redness.

## 2023-08-03 NOTE — ED PROVIDER NOTE - CLINICAL SUMMARY MEDICAL DECISION MAKING FREE TEXT BOX
37 y F, no PMH, recent vaginal delivery on 07/21 with no complications, breast-feeding, now p/w 4-days onset of R breast pain, fever. No discharge/drainage. Pain radiating to the axilla. Been taking tylenol with minimal relief. Was started on Dicloxacillin by her OB provider yesterday. Denies cough, congestion, chest pain, shortness of breath, abdominal pain, vaginal discharge, vaginal bleeding, dysuria. Febrile and HR 110s on arrival. PE as noted above. Notable for R breast is mildly erythematous with small palpable nodule at 9 o'clock position, no discharge/drainage noted, L breast exam is normal. No abdominal tenderness, abdomen is soft. DDx include but not limited to lactation mastitis vs cellulitis vs abscess. Will get labs, sepsis workup, IV antibiotics, IV hydration, US R breast, reassess. 37 y F, no PMH, recent vaginal delivery on 07/21 with no complications, breast-feeding, now p/w 4-days onset of R breast pain, fever. No discharge/drainage. Pain radiating to the axilla. Been taking tylenol with minimal relief. Was started on Dicloxacillin by her OB provider yesterday. Denies cough, congestion, chest pain, shortness of breath, abdominal pain, vaginal discharge, vaginal bleeding, dysuria. Febrile and HR 110s on arrival. PE as noted above. Notable for R breast is mildly erythematous with small palpable nodule at 9 o'clock position, no discharge/drainage noted, L breast exam is normal. No abdominal tenderness, abdomen is soft. DDx include but not limited to lactation mastitis vs cellulitis vs abscess. Will get labs, sepsis workup, IV antibiotics, IV hydration, US R breast, reassess.    pettet attending- see attending attestation for my mdm

## 2023-08-03 NOTE — ED ADULT NURSE NOTE - NSFALLUNIVINTERV_ED_ALL_ED
Bed/Stretcher in lowest position, wheels locked, appropriate side rails in place/Call bell, personal items and telephone in reach/Instruct patient to call for assistance before getting out of bed/chair/stretcher/Non-slip footwear applied when patient is off stretcher/[place] to call system/Physically safe environment - no spills, clutter or unnecessary equipment/Purposeful proactive rounding/Room/bathroom lighting operational, light cord in reach

## 2023-08-03 NOTE — ED PROVIDER NOTE - ATTENDING CONTRIBUTION TO CARE
I, Rylan Singleton, performed a history and physical exam of the patient and discussed their management with the resident and/or advanced care provider. I reviewed the resident and/or advanced care provider's note and agree with the documented findings and plan of care. I was present and available for all procedures.    Well appearing and in NAD, head normal appearing atraumatic, trachea midline, no respiratory distress, lungs cta bilaterally, rrr no murmurs, soft NT ND abdomen, no visible extremity deformities, Alert and oriented, non focal neuro exam, skin warm and dry, normal affect and mood, no leg swelling, calf ttp or jvd    Patient presenting with mastitis examined with chaperone Kaylyn RN otherwise right breast with 9:00 to 12:00 focal tenderness and erythema likely favor clogged duct versus abscess otherwise no nipple discharge to suggest malignancy patient otherwise with sepsis needs IV antibiotics possible admission ultrasound to rule out right breast abscess IV fluids discussed with patient and spouse at bedside agreeable with plan, unlikely ACS PE pneumothorax dissection AAA pneumonia

## 2023-08-03 NOTE — ED ADULT TRIAGE NOTE - HOW PATIENT ADDRESSED, PROFILE
ANTICOAGULATION  MANAGEMENT-Home Monitor Managed by Exception    Manasa DIEGO French 80 year old female is on warfarin with therapeutic INR result. (Goal INR 2.0-3.0)    Recent labs: (last 7 days)     03/10/23  0000   INR 2.3         Previous INR was Therapeutic    Medication, diet, health changes since last INR:chart reviewed; none identified    Contacted within the last 12 weeks by phone on 1/27/23      AUBREY     Manasa was NOT contacted regarding therapeutic result today per home monitoring policy manage by exception agreement.   Current warfarin dose is to be continued:     Summary  As of 3/10/2023    Full warfarin instructions:  7.5 mg every Mon; 5 mg all other days   Next INR check:  3/24/2023           ?   Ember Amezcua RN  Anticoagulation Clinic  3/10/2023    _______________________________________________________________________     Anticoagulation Episode Summary     Current INR goal:  2.0-3.0   TTR:  89.2 % (1 y)   Target end date:  Indefinite   Send INR reminders to:  [facility]    Indications    Acute deep vein thrombosis (DVT) of axillary vein and internal jugular (H) (Resolved) [I82.A19]  Current use of long term anticoagulation (Resolved) [Z79.01]  Acute deep vein thrombosis (DVT) of axillary vein of right upper extremity (H) (Resolved) [I82.A11]  Personal history of DVT (deep vein thrombosis) [Z86.718]           Comments:  [employer] [facility]- Managed by exception         Anticoagulation Care Providers     Provider Role Specialty Phone number    Rema Braxton DO Referring Family Medicine [phone]    Jessica Anthony MD Referring Internal Medicine [phone]    Christopher Smith MD Responsible Hematology [phone]           Ibrahima Lujan

## 2023-08-03 NOTE — ED PROVIDER NOTE - NS ED ROS FT
Constitutional:  (-) fever, (-) chills, (-) lethargy  Eyes:  (-) eye pain (-) visual changes  ENMT: (-) nasal discharge, (-) sore throat. (-) neck pain or stiffness  Chest: (+) R breast pain   Cardiac: (-) chest pain (-) palpitations  Respiratory:  (-) cough (-) respiratory distress.   GI:  (-) nausea (-) vomiting (-) diarrhea (-) abdominal pain.  :  (-) dysuria (-) frequency (-) burning.  MS:  (-) back pain (-) joint pain.  Neuro:  (-) headache (-) numbness (-) tingling (-) focal weakness  Skin:  (-) rash  Except as documented in the HPI,  all other systems are negative

## 2023-08-03 NOTE — H&P ADULT - HISTORY OF PRESENT ILLNESS
FADY HEREDIA  37y  Female [id number]    HPI: 38yo  PPD#13  presenting with fevers and breast tenderness and erythema. She started feeling right breast tenderness about one week ago. Her  has had difficulty latching and she has noticed decreased volume of breast milk production. She had a fever at home to 102 F 4 days ago. She was prescribed dicloxacillin for mastitis and started taking it on . Today she has erythema of her right breast and has tenderness in her left breast as well, so she presented to the ED. She has taken a total of 4 doses of dicloxacillin. In the ED she feels chills, nausea, and headache. She denies chest pain, shortness of breath, vomiting. She reports abdominal pain is well controlled and lochia within normal limits.        Name of GYN Physician: Jorge    OBHx:    FT , M, 5lbs, c/b GDM and gHTN  2018 SAB s/p D&C   2022   GynHx: h/o PCOS, abnormal Paps and ovarian cysts. Denies STI or fibroids   PMH: obesity   PSH: tonsillectomy, LSC ovarian cystectomy ,  LSC cholecystectomy,  gastric sleeve  Meds: PNV, bASA, Vit D  Allergies: NKDA  Social: denies alcohol/tobacco/drug use in pregnancy   Psych: denies anxiety/depression

## 2023-08-04 LAB
BASOPHILS # BLD AUTO: 0.04 K/UL — SIGNIFICANT CHANGE UP (ref 0–0.2)
BASOPHILS NFR BLD AUTO: 0.3 % — SIGNIFICANT CHANGE UP (ref 0–2)
EOSINOPHIL # BLD AUTO: 0.37 K/UL — SIGNIFICANT CHANGE UP (ref 0–0.5)
EOSINOPHIL NFR BLD AUTO: 2.5 % — SIGNIFICANT CHANGE UP (ref 0–6)
HCT VFR BLD CALC: 37.9 % — SIGNIFICANT CHANGE UP (ref 34.5–45)
HGB BLD-MCNC: 12.6 G/DL — SIGNIFICANT CHANGE UP (ref 11.5–15.5)
IMM GRANULOCYTES NFR BLD AUTO: 0.5 % — SIGNIFICANT CHANGE UP (ref 0–0.9)
LYMPHOCYTES # BLD AUTO: 1.97 K/UL — SIGNIFICANT CHANGE UP (ref 1–3.3)
LYMPHOCYTES # BLD AUTO: 13.2 % — SIGNIFICANT CHANGE UP (ref 13–44)
MCHC RBC-ENTMCNC: 30.3 PG — SIGNIFICANT CHANGE UP (ref 27–34)
MCHC RBC-ENTMCNC: 33.2 GM/DL — SIGNIFICANT CHANGE UP (ref 32–36)
MCV RBC AUTO: 91.1 FL — SIGNIFICANT CHANGE UP (ref 80–100)
MONOCYTES # BLD AUTO: 0.62 K/UL — SIGNIFICANT CHANGE UP (ref 0–0.9)
MONOCYTES NFR BLD AUTO: 4.1 % — SIGNIFICANT CHANGE UP (ref 2–14)
NEUTROPHILS # BLD AUTO: 11.87 K/UL — HIGH (ref 1.8–7.4)
NEUTROPHILS NFR BLD AUTO: 79.4 % — HIGH (ref 43–77)
NRBC # BLD: 0 /100 WBCS — SIGNIFICANT CHANGE UP (ref 0–0)
PLATELET # BLD AUTO: 271 K/UL — SIGNIFICANT CHANGE UP (ref 150–400)
RBC # BLD: 4.16 M/UL — SIGNIFICANT CHANGE UP (ref 3.8–5.2)
RBC # FLD: 13 % — SIGNIFICANT CHANGE UP (ref 10.3–14.5)
WBC # BLD: 14.95 K/UL — HIGH (ref 3.8–10.5)
WBC # FLD AUTO: 14.95 K/UL — HIGH (ref 3.8–10.5)

## 2023-08-04 PROCEDURE — 99222 1ST HOSP IP/OBS MODERATE 55: CPT

## 2023-08-04 RX ORDER — LANOLIN
1 OINTMENT (GRAM) TOPICAL THREE TIMES A DAY
Refills: 0 | Status: DISCONTINUED | OUTPATIENT
Start: 2023-08-04 | End: 2023-08-06

## 2023-08-04 RX ORDER — CEFAZOLIN SODIUM 1 G
1000 VIAL (EA) INJECTION EVERY 8 HOURS
Refills: 0 | Status: DISCONTINUED | OUTPATIENT
Start: 2023-08-04 | End: 2023-08-06

## 2023-08-04 RX ADMIN — HEPARIN SODIUM 5000 UNIT(S): 5000 INJECTION INTRAVENOUS; SUBCUTANEOUS at 18:55

## 2023-08-04 RX ADMIN — Medication 100 MILLIGRAM(S): at 22:27

## 2023-08-04 RX ADMIN — Medication 100 MILLIGRAM(S): at 14:10

## 2023-08-04 RX ADMIN — HEPARIN SODIUM 5000 UNIT(S): 5000 INJECTION INTRAVENOUS; SUBCUTANEOUS at 05:31

## 2023-08-04 NOTE — PROGRESS NOTE ADULT - SUBJECTIVE AND OBJECTIVE BOX
OB Postpartum Progress Note: PPD #14    No acute overnight events. Endorsing tenderness and erythema of bilateral breasts. States she is ambulating, voiding spontaneously, passing gas, and tolerating regular diet. Denies CP, SOB, N/V, HA, blurred vision, epigastric pain.    Vital Signs Last 24 Hours  T(C): 37.1 (08-04-23 @ 05:26), Max: 38.8 (08-03-23 @ 14:57)  HR: 90 (08-04-23 @ 05:26) (75 - 104)  BP: 122/85 (08-04-23 @ 05:26) (107/73 - 131/88)  RR: 18 (08-04-23 @ 05:26) (18 - 20)  SpO2: 97% (08-04-23 @ 05:26) (96% - 100%)    Physical Exam:  General: NAD, resting comfortably in bed   Abdomen: Soft, non-tender, non-distended, fundus firm  Extremities: Full ROM, moving all extremities spontaneously  Pelvic: Lochia wnl    Labs:    Blood Type: B Positive  Antibody Screen: --  RPR: Negative               12.6   14.95 )-----------( 271      ( 08-04 @ 06:32 )             37.9                13.0   19.57 )-----------( 297      ( 08-03 @ 15:50 )             39.0                13.0   11.94 )-----------( 293      ( 07-21 @ 05:59 )             37.6         MEDICATIONS  (STANDING):  heparin   Injectable 5000 Unit(s) SubCutaneous every 12 hours  vancomycin  IVPB 1250 milliGRAM(s) IV Intermittent every 12 hours    MEDICATIONS  (PRN):  acetaminophen     Tablet .. 975 milliGRAM(s) Oral every 6 hours PRN Moderate Pain (4 - 6)  ibuprofen  Tablet. 600 milliGRAM(s) Oral every 6 hours PRN Severe Pain (7 - 10)

## 2023-08-04 NOTE — PROGRESS NOTE ADULT - ASSESSMENT
36yo  PPD#14  presenting with fevers and breast tenderness and erythema. Patient was febrile to 38.8 in the ED. Tachycardia to 104 is now resolved. WBC 19.57. Breast ultrasound negative for abscess. Patient is clinically stable. She admitted for IV antibiotics due to failed outpatient antibiotics:    Neuro: Tynelol and Motrin PRN for pain  CV: Hemodynamically stable. Monitor VS. AM CBC  Pulm: Saturating well on room air  GI: Regular diet  : Voiding spontaneously  ID: Patient admitted for mastitis  - s/p Clindamycin x1 (8/3)  - ID preliminary recommendations: vancomycin 1250 q12h. f/u final ID recs  - f/u BCx/Ucx (8/3)  - Encourage regular breast pumping while inpatient  FEN: SLIV  Heme: HSQ for DVT PPX    Amyeo Afroz Jereen, PGY-3

## 2023-08-04 NOTE — CONSULT NOTE ADULT - SUBJECTIVE AND OBJECTIVE BOX
Patient is a 37y old  Female who presents with a chief complaint of mastitis with failed outpatient treatment (04 Aug 2023 07:41)    HPI:  37F PPD#13  presenting with fevers and breast tenderness and erythema on 8/3.  Symptom started .  she was started on dicloxacillin on , however, fever continued and pain continued.  She came to the ER 8/3 and had fever, leukocytosis.  BC sent.  Initially started on clindamycin and changed to vancomycin.  She is pumping and plans to breast feed.  She is feeling a little better today.    ID asked to help management.    OBHx:   2016 FT , M, 5lbs, c/b GDM and gHTN  2018 SAB s/p D&C   2022   GynHx: h/o PCOS, abnormal Paps and ovarian cysts. Denies STI or fibroids   PMH: obesity   PSH: tonsillectomy, LSC ovarian cystectomy ,  LSC cholecystectomy,  gastric sleeve  Meds: PNV, bASA, Vit D  Allergies: NKDA  Social: denies alcohol/tobacco/drug use in pregnancy   Psych: denies anxiety/depression    (03 Aug 2023 19:39)       prior hospital charts reviewed [  ]  primary team notes reviewed [ x ]  other consultant notes reviewed [  ]    PAST MEDICAL & SURGICAL HISTORY:  Gallstones '17  Obesity (BMI 30-39.9)  Ovarian cyst age 18  Right   (normal spontaneous vaginal delivery) '16  Ovarian cyst 2017 Removal of cyst: Right  History of tonsillectomy childhood  S/P laparoscopic cholecystectomy  2017  H/O gastric sleeve    Allergies  No Known Allergies    ANTIMICROBIALS:  ceFAZolin   IVPB   100 mL/Hr IV Intermittent (23 @ 14:10)    clindamycin IVPB   100 mL/Hr IV Intermittent (23 @ 15:54)    vancomycin  IVPB   166.67 mL/Hr IV Intermittent (23 @ 23:20)    MEDICATIONS  (STANDING):  acetaminophen     Tablet .. 975 every 6 hours PRN  heparin   Injectable 5000 every 12 hours  ibuprofen  Tablet. 600 every 6 hours PRN    SOCIAL HISTORY:   does not drink or smoke    FAMILY HISTORY:  Family history of hypertension in mother (Mother)    REVIEW OF SYSTEMS  [  ] ROS unobtainable because:    [ x ] All other systems negative except as noted below:	    Constitutional:  [x ] fever [ ] chills  [ ] weight loss  [ ] weakness  Skin:  [ x] rash [ ] phlebitis	  Eyes: [ ] icterus [ ] pain  [ ] discharge	  ENMT: [ ] sore throat  [ ] thrush [ ] ulcers [ ] exudates  Respiratory: [ ] dyspnea [ ] hemoptysis [ ] cough [ ] sputum	  Cardiovascular:  [ ] chest pain [ ] palpitations [ ] edema	  Gastrointestinal:  [ ] nausea [ ] vomiting [ ] diarrhea [ ] constipation [ ] pain	  Genitourinary:  [ ] dysuria [ ] frequency [ ] hematuria [ ] discharge [ ] flank pain  [ ] incontinence  Musculoskeletal:  [ ] myalgias [ ] arthralgias [ ] arthritis  [ ] back pain  Neurological:  [ ] headache [ ] seizures  [ ] confusion/altered mental status  Psychiatric:  [ ] anxiety [ ] depression	  Hematology/Lymphatics:  [ ] lymphadenopathy  Endocrine:  [ ] adrenal [ ] thyroid  Allergic/Immunologic:	 [ ] transplant [ ] seasonal    Vital Signs Last 24 Hrs  T(F): 98.6 (23 @ 12:10), Max: 101.8 (23 @ 14:57)  Vital Signs Last 24 Hrs  HR: 87 (23 @ 12:10) (75 - 104)  BP: 111/75 (23 @ 12:10) (107/73 - 131/88)  RR: 18 (23 @ 12:10)  SpO2: 97% (23 @ 12:10) (96% - 100%)  Wt(kg): --    PHYSICAL EXAM:  Constitutional: non-toxic, no distress  HEAD/EYES: anicteric  ENT:  supple  Cardiovascular:   normal S1, S2  Respiratory:  clear BS bilaterally  GI:  soft, non-tender, normal bowel sounds  :  no thapa  Musculoskeletal:  no synovitis  Neurologic: awake and alert, normal strength, no focal findings  Skin:  R breast with erythema laterally, no purulence, mildly tender, border drawn; left breast mildly tender, no erythema  Heme/Onc: no lymphadenopathy   Psychiatric:  awake, alert, appropriate mood                        12.6   14.95 )-----------( 271      ( 04 Aug 2023 06:32 )             37.9     137  |  103  |  11  ----------------------------<  137<H>  4.0   |  20<L>  |  0.76    Ca    9.1      03 Aug 2023 15:50    TPro  7.0  /  Alb  3.6  /  TBili  0.3  /  DBili  x   /  AST  14  /  ALT  13  /  AlkPhos  100      WBC Count: 14.95 (23 @ 06:32)  WBC Count: 19.57 (23 @ 15:50)  WBC Count: 11.94 (23 @ 05:59)    MICROBIOLOGY:  8/3 BC pending    RADIOLOGY:  imaging below personally reviewed and agree with findings    US Breast Limited, Right (23 @ 17:23) >  IMPRESSION:  No sonographic evidence of breast abscess.  Clinical management of mastitis is recommended.  If symptoms do not resolve clinically, additional imaging in a dedicated breast imaging center should be performed to exclude the possibility of malignancy.

## 2023-08-04 NOTE — CONSULT NOTE ADULT - ASSESSMENT
37F PPD#13  (2023) presenting with fevers and breast tenderness and erythema on 8/3 that started .   Found to have sepsis due to mastitis.  Sonogram did not see abscess.  No hx MRSA infection.    Mastitis  - can change to ancef 1g q8  - f/u BC  - trend wbc and monitor fever curve    I have discussed plan of care as detailed above with OB chief resident    Please call the ID service [phone] with questions or concerns over the weekend

## 2023-08-05 LAB
BASOPHILS # BLD AUTO: 0.03 K/UL — SIGNIFICANT CHANGE UP (ref 0–0.2)
BASOPHILS NFR BLD AUTO: 0.5 % — SIGNIFICANT CHANGE UP (ref 0–2)
EOSINOPHIL # BLD AUTO: 0.42 K/UL — SIGNIFICANT CHANGE UP (ref 0–0.5)
EOSINOPHIL NFR BLD AUTO: 7 % — HIGH (ref 0–6)
HCT VFR BLD CALC: 36.5 % — SIGNIFICANT CHANGE UP (ref 34.5–45)
HGB BLD-MCNC: 12.2 G/DL — SIGNIFICANT CHANGE UP (ref 11.5–15.5)
IMM GRANULOCYTES NFR BLD AUTO: 0.3 % — SIGNIFICANT CHANGE UP (ref 0–0.9)
LYMPHOCYTES # BLD AUTO: 1.76 K/UL — SIGNIFICANT CHANGE UP (ref 1–3.3)
LYMPHOCYTES # BLD AUTO: 29.5 % — SIGNIFICANT CHANGE UP (ref 13–44)
MCHC RBC-ENTMCNC: 30.3 PG — SIGNIFICANT CHANGE UP (ref 27–34)
MCHC RBC-ENTMCNC: 33.4 GM/DL — SIGNIFICANT CHANGE UP (ref 32–36)
MCV RBC AUTO: 90.6 FL — SIGNIFICANT CHANGE UP (ref 80–100)
MONOCYTES # BLD AUTO: 0.44 K/UL — SIGNIFICANT CHANGE UP (ref 0–0.9)
MONOCYTES NFR BLD AUTO: 7.4 % — SIGNIFICANT CHANGE UP (ref 2–14)
NEUTROPHILS # BLD AUTO: 3.3 K/UL — SIGNIFICANT CHANGE UP (ref 1.8–7.4)
NEUTROPHILS NFR BLD AUTO: 55.3 % — SIGNIFICANT CHANGE UP (ref 43–77)
NRBC # BLD: 0 /100 WBCS — SIGNIFICANT CHANGE UP (ref 0–0)
PLATELET # BLD AUTO: 285 K/UL — SIGNIFICANT CHANGE UP (ref 150–400)
RBC # BLD: 4.03 M/UL — SIGNIFICANT CHANGE UP (ref 3.8–5.2)
RBC # FLD: 12.8 % — SIGNIFICANT CHANGE UP (ref 10.3–14.5)
WBC # BLD: 5.97 K/UL — SIGNIFICANT CHANGE UP (ref 3.8–10.5)
WBC # FLD AUTO: 5.97 K/UL — SIGNIFICANT CHANGE UP (ref 3.8–10.5)

## 2023-08-05 PROCEDURE — 99231 SBSQ HOSP IP/OBS SF/LOW 25: CPT

## 2023-08-05 PROCEDURE — 99232 SBSQ HOSP IP/OBS MODERATE 35: CPT

## 2023-08-05 RX ADMIN — HEPARIN SODIUM 5000 UNIT(S): 5000 INJECTION INTRAVENOUS; SUBCUTANEOUS at 19:08

## 2023-08-05 RX ADMIN — Medication 100 MILLIGRAM(S): at 14:18

## 2023-08-05 RX ADMIN — HEPARIN SODIUM 5000 UNIT(S): 5000 INJECTION INTRAVENOUS; SUBCUTANEOUS at 06:02

## 2023-08-05 RX ADMIN — Medication 100 MILLIGRAM(S): at 06:03

## 2023-08-05 RX ADMIN — Medication 100 MILLIGRAM(S): at 21:38

## 2023-08-05 NOTE — PROGRESS NOTE ADULT - TIME BILLING
I saw and evaluated Ms. Lujan and agree with above.   She is feeling much better and has been afebrile.   Appreciate ID recs. Will continue IV antibiotics and reassess tomorrow.     Oscar FINNEY
I saw and evaluated Ms. Lujan and agree with above.   Appreciate ID input, switched to Ancef IV.   Warm compress, motrin/tylenol.   She will continue to pump and I counseled her that it is safe to feed infant the pumped milk.  Lanolin cream to nipples.   Oscar FINNEY

## 2023-08-05 NOTE — PROGRESS NOTE ADULT - SUBJECTIVE AND OBJECTIVE BOX
OB Progress Note:  PPD #15    S: No acute overnight events. Endorsing tenderness and erythema of bilateral breasts. States she is ambulating, voiding spontaneously, passing gas, and tolerating regular diet. Denies CP, SOB, N/V, HA, blurred vision, epigastric pain.    O:  Vitals:  Vital Signs Last 24 Hrs  T(C): 36.5 (05 Aug 2023 05:50), Max: 37.1 (04 Aug 2023 17:19)  T(F): 97.7 (05 Aug 2023 05:50), Max: 98.8 (04 Aug 2023 21:03)  HR: 67 (05 Aug 2023 05:50) (67 - 87)  BP: 101/69 (05 Aug 2023 05:50) (101/69 - 119/77)  BP(mean): --  RR: 18 (05 Aug 2023 05:50) (18 - 18)  SpO2: 97% (05 Aug 2023 05:50) (97% - 98%)    Parameters below as of 05 Aug 2023 05:50  Patient On (Oxygen Delivery Method): room air        MEDICATIONS  (STANDING):  ceFAZolin   IVPB 1000 milliGRAM(s) IV Intermittent every 8 hours  heparin   Injectable 5000 Unit(s) SubCutaneous every 12 hours  lanolin Cream 1 Application(s) Topical three times a day      Labs:  Blood type: B Positive  Rubella IgG: RPR: Negative                          12.6   14.95<H> >-----------< 271    (  @ 06:32 )             37.9                        13.0   19.57<H> >-----------< 297    (  @ 15:50 )             39.0    23 @ 15:50      137  |  103  |  11  ----------------------------<  137<H>  4.0   |  20<L>  |  0.76        Ca    9.1      03 Aug 2023 15:50    TPro  7.0  /  Alb  3.6  /  TBili  0.3  /  DBili  x   /  AST  14  /  ALT  13  /  AlkPhos  100  23 @ 15:50          Physical Exam:  General: NAD  Chest: erythematous swelling of bilateral breast  Abdomen: Soft, non-tender, non-distended, fundus firm  Vaginal: Lochia wnl  Extremities: No erythema/edema   OB Progress Note:  PPD #15    S: No acute overnight events. Endorsing tenderness and erythema of bilateral breasts. States she is ambulating, voiding spontaneously, passing gas, and tolerating regular diet. Denies CP, SOB, N/V, HA, blurred vision, epigastric pain.    O:  Vitals:  Vital Signs Last 24 Hrs  T(C): 36.5 (05 Aug 2023 05:50), Max: 37.1 (04 Aug 2023 17:19)  T(F): 97.7 (05 Aug 2023 05:50), Max: 98.8 (04 Aug 2023 21:03)  HR: 67 (05 Aug 2023 05:50) (67 - 87)  BP: 101/69 (05 Aug 2023 05:50) (101/69 - 119/77)  RR: 18 (05 Aug 2023 05:50) (18 - 18)  SpO2: 97% (05 Aug 2023 05:50) (97% - 98%)    Parameters below as of 05 Aug 2023 05:50  Patient On (Oxygen Delivery Method): room air        MEDICATIONS  (STANDING):  ceFAZolin   IVPB 1000 milliGRAM(s) IV Intermittent every 8 hours  heparin   Injectable 5000 Unit(s) SubCutaneous every 12 hours  lanolin Cream 1 Application(s) Topical three times a day      Labs:  Blood type: B Positive  Rubella IgG: RPR: Negative                          12.6   14.95<H> >-----------< 271    (  @ 06:32 )             37.9                        13.0   19.57<H> >-----------< 297    (  @ 15:50 )             39.0    23 @ 15:50      137  |  103  |  11  ----------------------------<  137<H>  4.0   |  20<L>  |  0.76        Ca    9.1      03 Aug 2023 15:50    TPro  7.0  /  Alb  3.6  /  TBili  0.3  /  DBili  x   /  AST  14  /  ALT  13  /  AlkPhos  100  23 @ 15:50          Physical Exam:  General: NAD  Chest: erythematous swelling of bilateral breast  Abdomen: Soft, non-tender, non-distended, fundus firm  Vaginal: Lochia wnl  Extremities: No erythema/edema

## 2023-08-05 NOTE — PROGRESS NOTE ADULT - ASSESSMENT
38yo  PPD#15  presenting with fevers and breast tenderness and erythema. Patient was febrile to 38.8 in the ED. Tachycardia to 104 is now resolved. WBC 19.57. Breast ultrasound negative for abscess. Patient is clinically stable. She admitted for IV antibiotics due to failed outpatient antibiotics:    Neuro: Tynelol and Motrin PRN for pain  CV: Hemodynamically stable. Monitor VS. Leukocytosis improving.  Pulm: Saturating well on room air  GI: Regular diet  : Voiding spontaneously  ID: Patient admitted for mastitis  - s/p Clindamycin x1 & Vanco (8/3)  - ID final recommendations: Ancef 1g q8hr, Trend WBC  - f/u BCx; prelim: NGTD  - f/u Ucx (8/3)  - Encourage regular breast pumping while inpatient; will f/u re breast feeding consult for expression s/p improvement in breast tenderness   FEN: SLIV  Heme: HSQ for DVT PPX    Amyeo Afroz Jereen, PGY-3   38yo  PPD#15  presenting with fevers and breast tenderness and erythema. Patient was febrile to 38.8 in the ED. Tachycardia to 104 is now resolved. WBC 19.57. Breast ultrasound negative for abscess. Patient is clinically stable. She admitted for IV antibiotics due to failed outpatient antibiotics:    Neuro: Tynelol and Motrin PRN for pain  CV: Hemodynamically stable. Monitor VS. Leukocytosis improving.  Pulm: Saturating well on room air  GI: Regular diet  : Voiding spontaneously  ID: Patient admitted for mastitis  - ID recs: Ancef 1g q8hr, Trend WBC  - s/p Clindamycin x1 & Vanco (8/3)  - Started on Ancef 1gq8HR  - f/u BCx; prelim: NGTD  - f/u Ucx (8/3)  - Encourage regular breast pumping while inpatient; will f/u re breast feeding consult for expression s/p improvement in breast tenderness   FEN: SLIV  Heme: HSQ for DVT PPX    Amyeo Afroz Jereen, PGY-3

## 2023-08-05 NOTE — PROGRESS NOTE ADULT - SUBJECTIVE AND OBJECTIVE BOX
INFECTIOUS DISEASES FOLLOW UP--Juan C Haddad MD  Pager [phone]    This is a follow up note for this  37y Female with  Mastitis without abscess  improving.  much less swollen, painful and red.  no fevers and good appetite    Further ROS:  CONSTITUTIONAL:  No fever, good appetite    Allergies  No Known Allergies    ANTIBIOTICS/RELEVANT:  antimicrobials  ceFAZolin   IVPB 1000 milliGRAM(s) IV Intermittent every 8 hours    OTHER:  acetaminophen     Tablet .. 975 milliGRAM(s) Oral every 6 hours PRN  heparin   Injectable 5000 Unit(s) SubCutaneous every 12 hours  ibuprofen  Tablet. 600 milliGRAM(s) Oral every 6 hours PRN  lanolin Cream 1 Application(s) Topical three times a day    Objective:  Vital Signs Last 24 Hrs  T(C): 36.5 (05 Aug 2023 05:50), Max: 37.1 (04 Aug 2023 17:19)  T(F): 97.7 (05 Aug 2023 05:50), Max: 98.8 (04 Aug 2023 21:03)  HR: 67 (05 Aug 2023 05:50) (67 - 87)  BP: 101/69 (05 Aug 2023 05:50) (101/69 - 119/77)  BP(mean): --  RR: 18 (05 Aug 2023 05:50) (18 - 18)  SpO2: 97% (05 Aug 2023 05:50) (97% - 98%)    Parameters below as of 05 Aug 2023 05:50  Patient On (Oxygen Delivery Method): room air    PHYSICAL EXAM:  Constitutional:no acute distress   Breasts bilateral w few patches of redness that are fading  	  Gastrointestinal:soft, (+) BS, no tenderness  Extremities:no e/e/c  No Lymphadenopathy  IV sites not inflammed.    LABS:                        12.2   5.97  )-----------( 285      ( 05 Aug 2023 06:22 )             36.5     08-03    137  |  103  |  11  ----------------------------<  137<H>  4.0   |  20<L>  |  0.76    Ca    9.1      03 Aug 2023 15:50    TPro  7.0  /  Alb  3.6  /  TBili  0.3  /  DBili  x   /  AST  14  /  ALT  13  /  AlkPhos  100  08-03    PT/INR - ( 03 Aug 2023 15:50 )   PT: 12.6 sec;   INR: 1.15 ratio      PTT - ( 03 Aug 2023 15:50 )  PTT:32.6 sec  Urinalysis Basic - ( 03 Aug 2023 15:50 )    Color: x / Appearance: x / SG: x / pH: x  Gluc: 137 mg/dL / Ketone: x  / Bili: x / Urobili: x   Blood: x / Protein: x / Nitrite: x   Leuk Esterase: x / RBC: x / WBC x   Sq Epi: x / Non Sq Epi: x / Bacteria: x    MICROBIOLOGY: no + cx    imp/rx:  postpartum mastitis.  No fevers, clinically improving and wbc count wnl now.  likely mssa driven.    continue ancef--probably can dc on po keflex in next 24-48 hours...perhaps even today---defer to ob team.

## 2023-08-06 ENCOUNTER — TRANSCRIPTION ENCOUNTER (OUTPATIENT)
Age: 38
End: 2023-08-06

## 2023-08-06 VITALS
OXYGEN SATURATION: 99 % | SYSTOLIC BLOOD PRESSURE: 100 MMHG | DIASTOLIC BLOOD PRESSURE: 77 MMHG | RESPIRATION RATE: 18 BRPM | HEART RATE: 87 BPM | TEMPERATURE: 98 F

## 2023-08-06 LAB
BASOPHILS # BLD AUTO: 0.03 K/UL — SIGNIFICANT CHANGE UP (ref 0–0.2)
BASOPHILS NFR BLD AUTO: 0.5 % — SIGNIFICANT CHANGE UP (ref 0–2)
CULTURE RESULTS: NO GROWTH — SIGNIFICANT CHANGE UP
EOSINOPHIL # BLD AUTO: 0.41 K/UL — SIGNIFICANT CHANGE UP (ref 0–0.5)
EOSINOPHIL NFR BLD AUTO: 6.9 % — HIGH (ref 0–6)
HCT VFR BLD CALC: 37.2 % — SIGNIFICANT CHANGE UP (ref 34.5–45)
HGB BLD-MCNC: 12.2 G/DL — SIGNIFICANT CHANGE UP (ref 11.5–15.5)
IMM GRANULOCYTES NFR BLD AUTO: 0.2 % — SIGNIFICANT CHANGE UP (ref 0–0.9)
LYMPHOCYTES # BLD AUTO: 2.08 K/UL — SIGNIFICANT CHANGE UP (ref 1–3.3)
LYMPHOCYTES # BLD AUTO: 35.3 % — SIGNIFICANT CHANGE UP (ref 13–44)
MCHC RBC-ENTMCNC: 29.8 PG — SIGNIFICANT CHANGE UP (ref 27–34)
MCHC RBC-ENTMCNC: 32.8 GM/DL — SIGNIFICANT CHANGE UP (ref 32–36)
MCV RBC AUTO: 91 FL — SIGNIFICANT CHANGE UP (ref 80–100)
MONOCYTES # BLD AUTO: 0.38 K/UL — SIGNIFICANT CHANGE UP (ref 0–0.9)
MONOCYTES NFR BLD AUTO: 6.4 % — SIGNIFICANT CHANGE UP (ref 2–14)
NEUTROPHILS # BLD AUTO: 2.99 K/UL — SIGNIFICANT CHANGE UP (ref 1.8–7.4)
NEUTROPHILS NFR BLD AUTO: 50.7 % — SIGNIFICANT CHANGE UP (ref 43–77)
NRBC # BLD: 0 /100 WBCS — SIGNIFICANT CHANGE UP (ref 0–0)
PLATELET # BLD AUTO: 320 K/UL — SIGNIFICANT CHANGE UP (ref 150–400)
RBC # BLD: 4.09 M/UL — SIGNIFICANT CHANGE UP (ref 3.8–5.2)
RBC # FLD: 12.7 % — SIGNIFICANT CHANGE UP (ref 10.3–14.5)
SPECIMEN SOURCE: SIGNIFICANT CHANGE UP
WBC # BLD: 5.9 K/UL — SIGNIFICANT CHANGE UP (ref 3.8–10.5)
WBC # FLD AUTO: 5.9 K/UL — SIGNIFICANT CHANGE UP (ref 3.8–10.5)

## 2023-08-06 PROCEDURE — 85025 COMPLETE CBC W/AUTO DIFF WBC: CPT

## 2023-08-06 PROCEDURE — 85610 PROTHROMBIN TIME: CPT

## 2023-08-06 PROCEDURE — 82330 ASSAY OF CALCIUM: CPT

## 2023-08-06 PROCEDURE — 80053 COMPREHEN METABOLIC PANEL: CPT

## 2023-08-06 PROCEDURE — 96375 TX/PRO/DX INJ NEW DRUG ADDON: CPT

## 2023-08-06 PROCEDURE — 36415 COLL VENOUS BLD VENIPUNCTURE: CPT

## 2023-08-06 PROCEDURE — 84132 ASSAY OF SERUM POTASSIUM: CPT

## 2023-08-06 PROCEDURE — 99238 HOSP IP/OBS DSCHRG MGMT 30/<: CPT

## 2023-08-06 PROCEDURE — 82947 ASSAY GLUCOSE BLOOD QUANT: CPT

## 2023-08-06 PROCEDURE — 84295 ASSAY OF SERUM SODIUM: CPT

## 2023-08-06 PROCEDURE — 85014 HEMATOCRIT: CPT

## 2023-08-06 PROCEDURE — 85018 HEMOGLOBIN: CPT

## 2023-08-06 PROCEDURE — 82435 ASSAY OF BLOOD CHLORIDE: CPT

## 2023-08-06 PROCEDURE — 93005 ELECTROCARDIOGRAM TRACING: CPT

## 2023-08-06 PROCEDURE — 82803 BLOOD GASES ANY COMBINATION: CPT

## 2023-08-06 PROCEDURE — 99285 EMERGENCY DEPT VISIT HI MDM: CPT

## 2023-08-06 PROCEDURE — 87040 BLOOD CULTURE FOR BACTERIA: CPT

## 2023-08-06 PROCEDURE — 85730 THROMBOPLASTIN TIME PARTIAL: CPT

## 2023-08-06 PROCEDURE — 76642 ULTRASOUND BREAST LIMITED: CPT

## 2023-08-06 PROCEDURE — 87086 URINE CULTURE/COLONY COUNT: CPT

## 2023-08-06 PROCEDURE — 83605 ASSAY OF LACTIC ACID: CPT

## 2023-08-06 PROCEDURE — 96374 THER/PROPH/DIAG INJ IV PUSH: CPT

## 2023-08-06 RX ORDER — CEPHALEXIN 500 MG
500 CAPSULE ORAL
Refills: 0 | Status: DISCONTINUED | OUTPATIENT
Start: 2023-08-06 | End: 2023-08-06

## 2023-08-06 RX ORDER — CEPHALEXIN 500 MG
1 CAPSULE ORAL
Qty: 28 | Refills: 0
Start: 2023-08-06 | End: 2023-08-12

## 2023-08-06 RX ORDER — LANOLIN
1 OINTMENT (GRAM) TOPICAL
Qty: 0 | Refills: 0 | DISCHARGE
Start: 2023-08-06

## 2023-08-06 RX ADMIN — Medication 100 MILLIGRAM(S): at 05:34

## 2023-08-06 RX ADMIN — HEPARIN SODIUM 5000 UNIT(S): 5000 INJECTION INTRAVENOUS; SUBCUTANEOUS at 05:35

## 2023-08-06 RX ADMIN — Medication 100 MILLIGRAM(S): at 13:26

## 2023-08-06 NOTE — DISCHARGE NOTE NURSING/CASE MANAGEMENT/SOCIAL WORK - PATIENT PORTAL LINK FT
You can access the FollowMyHealth Patient Portal offered by [facility] by registering at the following website: [email or web address]. By joining [facility]’s FollowMyHealth portal, you will also be able to view your health information using other applications (apps) compatible with our system.

## 2023-08-06 NOTE — DISCHARGE NOTE PROVIDER - NSDCMRMEDTOKEN_GEN_ALL_CORE_FT
cephalexin 500 mg oral capsule: 1 cap(s) orally 4 times a day x 7 days  lanolin topical cream: 1 Apply topically to affected area 3 times a day  Prenatal Multivitamins with Folic Acid 1 mg oral tablet: 1 tab(s) orally once a day

## 2023-08-06 NOTE — PROGRESS NOTE ADULT - ATTENDING COMMENTS
I saw and evaluated Ms. Lujan.   Significant improvement.   Breast erythema and tenderness is almost resolved.   Will discharge to home on 7 days of PO Keflex.  All questions answered.   Appreciate Lactation and ID consultations.  Oscar FINNEY

## 2023-08-06 NOTE — DISCHARGE NOTE NURSING/CASE MANAGEMENT/SOCIAL WORK - NSDCPEFALRISK_GEN_ALL_CORE
For information on Fall & Injury Prevention, visit: [email or web address] OR  [email or web address] OR  https://www.cdc.gov/steadi/patient.html

## 2023-08-06 NOTE — LACTATION INITIAL EVALUATION - LACTATION INTERVENTIONS
met with mom in room. baby is at home, not with mom at this admission. Mom was admitted for increasing pain and redness of both breasts with fever. Nipples are painful and not intact, Mom has been using lanolin and silverettes. Nipple care discussed. mom said she had been using a nipple shield but all feeds were very painful and nipples were bleeding and sore. discussed importance of working with a lactation support person to [place] # and community resources provided for lactation support after discharge. with deep latch or if choosing to use shield that they are applied correctly and fit well, also should not hurt. Mom said she is feeling much better today. Mom is pumping with [employer] pump that she rented and sending home her milk for baby. Mom said she calculated produciing about 460 ml per 24 hours at about 14 days post partum. Taught gentle massage, to not "squeeze" breast or press hard on breast tissue./initiate/review pumping guidelines and safe milk handling/reverse pressure softening/initiate/review supplementation plan due to medical indications/review techniques to manage sore nipples/engorgement/initiate/review breast massage/compression/reviewed importance of monitoring infant diapers, the breastfeeding log, and minimum output each day/reviewed feeding on demand/by cue at least 8 times a day/reviewed indications of inadequate milk transfer that would require supplementation
encouraged ongoing lactation support after d/c to assess latch and ensure deep no painful latch. discussed nipple care, has been following nipple care since yesterday and has experienced relief.  to continiue to pump to protect and grow milk supply until baby is efficiently feeding at breast/initiate/review pumping guidelines and safe milk handling/initiate/review supplementation plan due to medical indications/review techniques to manage sore nipples/engorgement/reviewed components of an effective feeding and at least 8 effective feedings per day required/reviewed importance of monitoring infant diapers, the breastfeeding log, and minimum output each day

## 2023-08-06 NOTE — DISCHARGE NOTE PROVIDER - NSDCCPCAREPLAN_GEN_ALL_CORE_FT
PRINCIPAL DISCHARGE DIAGNOSIS  Diagnosis: Acute mastitis  Assessment and Plan of Treatment:       SECONDARY DISCHARGE DIAGNOSES  Diagnosis: Sepsis  Assessment and Plan of Treatment:

## 2023-08-06 NOTE — PROGRESS NOTE ADULT - ASSESSMENT
36yo  PPD#16  presenting with fevers and breast tenderness and erythema. Patient was febrile to 38.8 in the ED. Tachycardia to 104 is now resolved. WBC 19.57. Breast ultrasound negative for abscess. Patient is clinically stable. She admitted for IV antibiotics due to failed outpatient antibiotics, now clinically improving with ancef (-).     Neuro: Tynelol and Motrin PRN for pain  CV: Hemodynamically stable. Monitor VS. Leukocytosis improving. fu AM CBC  Pulm: Saturating well on room air  GI: Regular diet  : Voiding spontaneously  ID: Patient admitted for mastitis  - ID recs: Ancef 1g q8hr, Trend WBC  - s/p Clindamycin x1 & Vanco (8/3)  - Started on Ancef 1gq8HR  - f/u BCx; prelim: NGTD  - f/u Ucx (8/3)  - Encourage regular breast pumping while inpatient; will f/u re breast feeding consult for expression s/p improvement in breast tenderness   FEN: SLIV  Heme: HSQ for DVT PPX    ALMITA Freire, PGY3

## 2023-08-06 NOTE — PROGRESS NOTE ADULT - SUBJECTIVE AND OBJECTIVE BOX
OB Progress Note:  PPD#16    S: 36yo  PPD#16 s/p , PP course c/b mastitis, patient reports feeling much improved since yesterday, breast pain and erythema still present, but she is able to walk and move more comfortably.    O:  Vitals:   Vital Signs Last 24 Hrs  T(C): 36.4 (06 Aug 2023 00:18), Max: 36.8 (05 Aug 2023 09:27)  T(F): 97.5 (06 Aug 2023 00:18), Max: 98.2 (05 Aug 2023 09:27)  HR: 69 (06 Aug 2023 00:18) (67 - 86)  BP: 113/79 (06 Aug 2023 00:18) (100/64 - 119/82)  BP(mean): --  RR: 18 (06 Aug 2023 00:18) (18 - 18)  SpO2: 99% (06 Aug 2023 00:18) (97% - 99%)    Parameters below as of 06 Aug 2023 00:18  Patient On (Oxygen Delivery Method): room air        MEDICATIONS  (STANDING):  ceFAZolin   IVPB 1000 milliGRAM(s) IV Intermittent every 8 hours  heparin   Injectable 5000 Unit(s) SubCutaneous every 12 hours  lanolin Cream 1 Application(s) Topical three times a day    MEDICATIONS  (PRN):  acetaminophen     Tablet .. 975 milliGRAM(s) Oral every 6 hours PRN Moderate Pain (4 - 6)  ibuprofen  Tablet. 600 milliGRAM(s) Oral every 6 hours PRN Severe Pain (7 - 10)      Labs:  Blood type: B Positive  Rubella IgG: RPR: Negative                          12.2   5.97 >-----------< 285    (  @ 06:22 )             36.5                        12.6   14.95<H> >-----------< 271    (  @ 06:32 )             37.9                        13.0   19.57<H> >-----------< 297    (  @ 15:50 )             39.0    23 @ 15:50      137  |  103  |  11  ----------------------------<  137<H>  4.0   |  20<L>  |  0.76        Ca    9.1      03 Aug 2023 15:50    TPro  7.0  /  Alb  3.6  /  TBili  0.3  /  DBili  x   /  AST  14  /  ALT  13  /  AlkPhos  100  23 @ 15:50          Physical Exam:  General: NAD  Breast: Bilaterally mild erythema, tender to palpation  Abdomen: soft, non-tender, non-distended  Vaginal: lochia wnl, exam deferred  Extremities: No erythema/calf tenderness

## 2023-08-06 NOTE — LACTATION INITIAL EVALUATION - AS EVIDENCED BY
Mom said that when baby latched prior to her readmission, it was very painful with nipples scabbed. sometimes mom said baby would latch more deeply and it was less painful . Mom has been pumping regularly when at the hospital and is now pumping over 500 ml per 24 hours. Mom will continiue to pump and send milk home to baby./patient stated/infant  from mother
patient stated/observation/history of breastfeeding difficulty/infant  from mother

## 2023-08-06 NOTE — DISCHARGE NOTE PROVIDER - CARE PROVIDER_API CALL
Marsha Goetz  Obstetrics and Gynecology  [address] [place] [address], [address]  [place] [address]  Phone: [phone]  Fax: [phone]  Established Patient  Follow Up Time:

## 2023-08-06 NOTE — LACTATION INITIAL EVALUATION - ACTUAL PROBLEM
ineffective breastfeeding/sore nipples/knowledge deficit/latch on difficulty
ineffective breastfeeding/knowledge deficit/mastitis/latch on difficulty

## 2023-08-06 NOTE — LACTATION INITIAL EVALUATION - INTERVENTION OUTCOME
Mom will f/u with lactation consultant after d/c. Mom plans to continue to pump 8 times per 24 horus and monitor amount pumped. mom pumped exclusively for her first and feels she will do that for this baby  but will work with someome also. Rsources given for locating LC to work with her, for now will continue to pump, and care for nipples to promote healing./verbalizes understanding/demonstrates understanding of teaching/needs met
provided with more milk storagebags/verbalizes understanding/demonstrates understanding of teaching/needs met

## 2023-08-06 NOTE — DISCHARGE NOTE PROVIDER - HOSPITAL COURSE
37 year old female readmitted after delivery with mastitis with failed outpatient treatment. Infectious disease service consultation recommended IV Ancef; she received treatment for 48 hours after having 1 dose of IV Vancomycin. Significant clinical improvement and afebrile x 36 hours. Discharged to home on hospital day #3 in stable condition on PO Keflex. She will follow up outpatient in 1 week.

## 2023-08-08 LAB
CULTURE RESULTS: SIGNIFICANT CHANGE UP
CULTURE RESULTS: SIGNIFICANT CHANGE UP
SPECIMEN SOURCE: SIGNIFICANT CHANGE UP
SPECIMEN SOURCE: SIGNIFICANT CHANGE UP

## 2023-08-09 ENCOUNTER — APPOINTMENT (OUTPATIENT)
Dept: OBGYN | Facility: CLINIC | Age: 38
End: 2023-08-09
Payer: COMMERCIAL

## 2023-08-09 VITALS
DIASTOLIC BLOOD PRESSURE: 68 MMHG | WEIGHT: 220 LBS | BODY MASS INDEX: 33.34 KG/M2 | SYSTOLIC BLOOD PRESSURE: 118 MMHG | HEIGHT: 68 IN

## 2023-08-09 PROCEDURE — 99213 OFFICE O/P EST LOW 20 MIN: CPT

## 2023-08-09 NOTE — END OF VISIT
[FreeTextEntry3] : I, Anita Mruovicente, acted as a scribe on behalf of Dr. Consuelo Cheek on 08/09/2023.  All medical entries made by the scribe where at my, Dr. Consuelo Cheek, direction and personally dictated by me on 08/09/2023. I have reviewed the chart and agree that the record accurately reflects my personal performance of the history, physical exam, assessment, and plan. I have also personally directed, reviewed and agreed with the chart.

## 2023-08-09 NOTE — HISTORY OF PRESENT ILLNESS
[Postpartum Follow Up] : postpartum follow up [Complications:___] : complications include: [unfilled] [Delivery Date: ___] : on [unfilled] [] : delivered by vaginal delivery [Male] : Delivery History: baby boy [Wt. ___] : weighing [unfilled] [Back to Normal] : is back to normal in size [Mild] : mild vaginal bleeding [Normal] : the vagina was normal [Examination Of The Breasts] : breasts are normal [Doing Well] : is doing well [No Sign of Infection] : is showing no signs of infection [Excellent Pain Control] : has excellent pain control [Breastfeeding] : not currently nursing [S/Sx PP Depression] : no signs/symptoms of postpartum depression [None] : No associated symptoms are reported [Cervix Sample Taken] : cervical sample not taken for a Pap smear [FreeTextEntry8] : 36 yo female pt present for f/u from hospitalization for mastitis s/p  23. It started in her right breast and spread to her left as well. She went to the hospital 23 and is now on abx.  [de-identified] : delivered by Dr. Patel [de-identified] : She is currently pumping. [de-identified] : no signs PPD, baby doing well

## 2023-08-09 NOTE — PLAN
[TextEntry] : 36 yo for f/u mastitis s/p  23 -Pt advised to finish abx (diclozacillin) -RTO in 3 wks for full PPV

## 2023-09-05 ENCOUNTER — APPOINTMENT (OUTPATIENT)
Dept: OBGYN | Facility: CLINIC | Age: 38
End: 2023-09-05
Payer: COMMERCIAL

## 2023-09-05 PROCEDURE — 0503F POSTPARTUM CARE VISIT: CPT

## 2023-09-05 NOTE — HISTORY OF PRESENT ILLNESS
[Delivery Date: ___] : on [unfilled] [] : delivered by vaginal delivery [Male] : Delivery History: baby boy [Wt. ___] : weighing [unfilled] [Back to Normal] : is back to normal in size [None] : no vaginal bleeding [Normal] : the vagina was normal [Examination Of The Breasts] : breasts are normal [Doing Well] : is doing well [No Sign of Infection] : is showing no signs of infection [FreeTextEntry8] : 6wk PPV.  Pt is doing well, reports some breast tenderness. [de-identified] : Delivered by  [de-identified] : pumping [de-identified] : Pt is doing well with no complaints. Discussed BC options. RTO in 3 months for annual.

## 2023-09-05 NOTE — END OF VISIT
[FreeTextEntry3] : I, Yelena Ramirez, acted as a scribe on behalf of Dr. Consuelo Cheek D.O. on 09/05/2023.  All medical entries made by the scribe were at my, Dr. Consuelo Cheek D.O, direction and personally dictated by me on 09/05/2023. I have reviewed the chart and agree that the record accurately reflects my personal performance of the history, physical exam, assessment and plan. I have also personally directed, reviewed, and agreed with the chart.

## 2023-11-03 ENCOUNTER — APPOINTMENT (OUTPATIENT)
Dept: OBGYN | Facility: CLINIC | Age: 38
End: 2023-11-03
Payer: COMMERCIAL

## 2023-11-03 VITALS
SYSTOLIC BLOOD PRESSURE: 112 MMHG | BODY MASS INDEX: 28.79 KG/M2 | WEIGHT: 190 LBS | DIASTOLIC BLOOD PRESSURE: 76 MMHG | HEIGHT: 68 IN

## 2023-11-03 DIAGNOSIS — Z01.419 ENCOUNTER FOR GYNECOLOGICAL EXAMINATION (GENERAL) (ROUTINE) W/OUT ABNORMAL FINDINGS: ICD-10-CM

## 2023-11-03 PROCEDURE — 99395 PREV VISIT EST AGE 18-39: CPT

## 2023-11-06 LAB — HPV HIGH+LOW RISK DNA PNL CVX: NOT DETECTED

## 2023-11-07 LAB — CYTOLOGY CVX/VAG DOC THIN PREP: NORMAL

## 2023-11-15 DIAGNOSIS — Z30.011 ENCOUNTER FOR INITIAL PRESCRIPTION OF CONTRACEPTIVE PILLS: ICD-10-CM

## 2023-11-15 RX ORDER — NORETHINDRONE 0.35 MG/1
0.35 TABLET ORAL
Qty: 84 | Refills: 2 | Status: ACTIVE | COMMUNITY
Start: 2023-11-15 | End: 1900-01-01

## 2024-03-22 ENCOUNTER — APPOINTMENT (OUTPATIENT)
Dept: OBGYN | Facility: CLINIC | Age: 39
End: 2024-03-22

## 2024-04-29 NOTE — PROGRESS NOTE ADULT - REASON FOR ADMISSION
Speech Therapy      Visit Type: Treatment and Discharge  -  Swallow  Reason for consult: Swallow evaluation in patient with acute CVA whom is s/p VFSS on 04/26/24.    CTA H/N: Occlusion of the right distal vertebral artery V3 or V4 segment  MRI brain: Multiple acute infarcts in the posterior circulation bilaterally, most pronounced in the right cerebellar hemisphere    VFSS on 04/26/24:   Patient with mild pharyngeal dysphagia characterized by occasional trace airway penetration with thin liquids and reduced UES opening resulting in mild to moderate pyriform sinus residue that is significantly reduced with use of multiple swallows. (+) inconsistent retroflow back into pyriform sinuses from upper esophagus with thin liquids that clears with repeat swallow. NO ASPIRATION noted during study. Patient continues to c/o change in vocal function since CVA and weak cough demonstrated. Recommend: ENT consult to assess vocal fold anatomy/physiology to determine reason for vocal change s/p CVA.      *NOTE: patient has h/o achalasia with myotomy*      Relevant History/Co-morbidities: depression  achalasia s/p myotomy 2023    Patient reports being independent prior to admission, has two sons (16/21 years), lives with her spouse, and works part time as a . Patient and her mother deny patient to have changes in speech/language/cognition.    SUBJECTIVE  - Patient agreed to participate in therapy this date.    Received pt in bed, awake and alert. Pt reported no changes or concerns w/ swallow function    Functional Cognition:    - Expression is verbal.     - Following commands intact.      Affect/Behavior: alert, appropriate, cooperative, calm and pleasant    Pain at onset of session:   Patient reports pain is not an issue/concern.    -  0/10       OBJECTIVE      Swallow    Numbers listed with consistency indicate IDDSI diet level.    Thin (0); straw; single swallow; sequential swallow   - Oral phase: WFL   - 
mastitis with failed outpatient treatment
mastitis with failed outpatient treatment
Pharyngeal phase: clinically unremarkable    Regular   - Oral: WFL   - Pharyngeal phase: clinically unremarkable                ASSESSMENT  Impairments: swallowing  Functional Limitations: eating/drinking    Pt presents with mild oropharyngeal dysphagia as confirmed on recent VFSE, however overall functional for baseline diet of regular solids + thin liquids. Pt tolerated trials today w/ adequate mastication/oral clearance and no clinical indicators of aspiration. Pt aware of swallow strategies and independently utilizes throughout meals. No further ST f/u warranted at this time, ST to sign off. May re-assess as clinically indicated in future if concerns for aspiration arise.     Progress: Improving as expected        Rehab Potential: excellent    Education:   - Present and ready to learn: patient  Education provided during session:  - role of SLP, dysphagia and aspiration precautions  - Results of above outlined education: Verbalizes understanding    Patient at end of session:    - location: in chair    - hand off to: rehab aide/tech    PLAN    Interventions:  Assess tolerance of least restrictive oral diet and clinical swallow evaluation    Plan/Goal Agreement:  Patient agrees with goals and individualized plan of care      RECOMMENDATIONS     -Diet:          *Regular and Liquid- Thin    -Medication Administration:         *patient preference    Compensatory strategies: swallow solids x3.    -Feeding Guidelines:          *eat slowly only when alert, sit fully upright for all po intake, small bites/sips, set up tray, allow extra time to swallow, feeds self, no talking while eating and sit up for 30 minutes after meal    -Additional Swallow Recommendations:         *frequent oral care    -Speech Reviewed Swallow:         *with patient/family, with clinical caregivers and feeding guidelines updated in room    -Consult:         *ENT      GOALS  Review date: 5/3/2024  Short Term Goals: to be met 7 days from date 
established, unless otherwise stated.  1. Patient will consume a regular consistency diet with thin liquids with functional oral phase skills and no CSA 95% of the time across a full meal for maximum nutrition and reduced risk of adverse events related to aspiration. GOAL MET  Long Term Goals: to be met by discharge from rehab program.  1. Patient will consume her least restrictive diet with functional oral phase skills and no CSA 95% of the time across a full meal for maximum nutrition and reduced risk of adverse events related to aspiration. GOAL MET    Documented in the chart in the following areas:    Assessment.        Therapy procedure time and total treatment time can be found documented on the Time Entry flowsheet  
mastitis with failed outpatient treatment
mastitis with failed outpatient treatment

## 2024-09-30 NOTE — ED PROVIDER NOTE - SEPSIS CONTRAINDICATION FLUID ADMINISTRATION
Office Visit- Urology  Maria Elena Sandoval 1945 MRN: [id number]      Assessment/Discussion/Plan    79 y.o. female managed by      Urinary Incontinence   -Stress urinary incontinence  predominant  -PVR 22 mL  -Patient's symptomatology is predominantly stress incontinence.    -Patient presents to the office.  She states that she visit with urogynecologist but that was not helpful.  She also is not interested in pelvic floor physical therapy.  She is looking for possible surgical intervention.  Offered her to meet with one of our physicians Dr. Durán  Will plan for cystoscopy for further evaluation      Chief Complaint:   Maria Elena is a 79 y.o. female presenting to the office for a follow up visit regarding urinary incontinence        Subjective    Hx 6/14/2023  78-year-old female   presents to the office for evaluation of urinary incontinence  -States that her main urinary symptoms is urinary continence with laughing sneezing coughing and standing up and urine testing out  -Was prescribed Myrbetriq 50 mg grams by outside provider she does not currently have a sense of urgency, frequency, dysuria, blood in urine  -No obstructive symptoms including hesitancy, straining to urinate, weak urinary stream, intermittency, prior episodes of urinary retention  -She utilizes 4-5 pads a day  -She has never told that she has a prolapse and had recent pelvic exam within 2023  -No prior surgeries on the kidneys or bladder.  She  does report mesh intravaginally.  -No personal history of malignancy  -She has no neurologic disease including stroke or diabetes  -Chronic back pain status post surgery -we will plate was dislodged     Hx 9/30/2024  Patient presents to the office today for follow-up.  She states that she visit with urogynecologist but she did not find it helpful.  She is still having significant urinary incontinence that she states that is affecting her quality of life to a significant degree she says that something needs  to be done about this.  She describes urinary incontinence without a sense of urgency      ROS:   Review of Systems   Constitutional: Negative.  Negative for chills, fatigue and fever.   HENT: Negative.     Respiratory:  Negative for shortness of breath.    Cardiovascular:  Negative for chest pain.   Gastrointestinal: Negative.  Negative for abdominal pain.   Endocrine: Negative.    Musculoskeletal: Negative.    Skin: Negative.    Neurological: Negative.  Negative for dizziness and light-headedness.   Hematological: Negative.    Psychiatric/Behavioral: Negative.           Past Medical History  Past Medical History:   Diagnosis Date    Arthritis     Fibromyalgia, primary     Sciatica        Past Surgical History  Past Surgical History:   Procedure Laterality Date    COLON SURGERY      colostomy    EPIDURAL BLOCK INJECTION N/A 08/25/2023    Procedure: L5-S1 LUMBAR epidural steroid injection (85339);  Surgeon: Hector Mora DO;  Location: [facility] MAIN OR;  Service: Pain Management     MS INJECT SI JOINT ARTHRGRPHY&/ANES/STEROID W/PRIMO Bilateral 06/09/2023    Procedure: SACROILIAC joint injection ([id number] );  Surgeon: Endy Christopher MD;  Location: [facility] MAIN OR;  Service: Pain Management     MS PRQ IMPLTJ NSTIM ELECTRODE ARRAY EPIDURAL Bilateral 7/10/2024    Procedure: MEDTRONIC SCS TRIAL;  Surgeon: Hector Mora DO;  Location: [facility] MAIN OR;  Service: Pain Management     SPINAL FUSION      SPINE SURGERY         Past Family History  Family History   Problem Relation Age of Onset    No Known Problems Mother     No Known Problems Father        Past Social history  Social History     Socioeconomic History    Marital status:      Spouse name: Not on file    Number of children: Not on file    Years of education: Not on file    Highest education level: Not on file   Occupational History    Not on file   Tobacco Use    Smoking status: Former     Types: Cigarettes    Smokeless tobacco: Never   Substance  "and Sexual Activity    Alcohol use: Never    Drug use: Never    Sexual activity: Never   Other Topics Concern    Not on file   Social History Narrative    Not on file     Social Determinants of Health     Financial Resource Strain: Not on file   Food Insecurity: Not on file   Transportation Needs: Not on file   Physical Activity: Not on file   Stress: Not on file   Social Connections: Not on file   Intimate Partner Violence: Not on file   Housing Stability: Not on file       Current Medications  Current Outpatient Medications   Medication Sig Dispense Refill    candesartan (ATACAND) 32 MG tablet Take 32 mg by mouth daily      carvedilol (COREG) 12.5 mg tablet Take 12.5 mg by mouth 2 (two) times a day with meals      ibuprofen (Advil) 200 mg tablet Take by mouth every 6 (six) hours as needed for mild pain      irbesartan (AVAPRO) 150 mg tablet       Multiple Vitamin (multivitamin) tablet Take 1 tablet by mouth daily      pantoprazole (PROTONIX) 40 mg tablet       prednisoLONE 5 MG (21) TBPK       venlafaxine (EFFEXOR-XR) 75 mg 24 hr capsule Take 75 mg by mouth daily      zolpidem (AMBIEN) 5 mg tablet       calcium carbonate (OS-HUE) 600 MG tablet Take 600 mg by mouth daily (Patient not taking: Reported on 7/16/2024)       No current facility-administered medications for this visit.       Allergies  No Known Allergies    OBJECTIVE    Vitals   Vitals:    09/30/24 1420   BP: 120/70   BP Location: Left arm   Patient Position: Sitting   Cuff Size: Standard   Pulse: 79   SpO2: 95%   Weight: 74.4 kg (164 lb)   Height: 5' 1\" (1.549 m)       PVR:    Physical Exam  Constitutional:       General: She is not in acute distress.     Appearance: Normal appearance. She is normal weight. She is not ill-appearing or toxic-appearing.   HENT:      Head: Normocephalic and atraumatic.   Eyes:      Conjunctiva/sclera: Conjunctivae normal.   Cardiovascular:      Rate and Rhythm: Normal rate.   Pulmonary:      Effort: Pulmonary effort is " normal. No respiratory distress.   Skin:     General: Skin is warm and dry.   Neurological:      General: No focal deficit present.      Mental Status: She is alert and oriented to person, place, and time.      Cranial Nerves: No cranial nerve deficit.   Psychiatric:         Mood and Affect: Mood normal.         Behavior: Behavior normal.         Thought Content: Thought content normal.          Labs:     Lab Results   Component Value Date    CREATININE 0.79 06/19/2024      Lab Results   Component Value Date    HGBA1C 6.4 (H) 06/19/2024     Lab Results   Component Value Date    CALCIUM 9.5 06/19/2024    K 4.2 06/19/2024    CO2 26 06/19/2024     06/19/2024    BUN 28 (H) 06/19/2024    CREATININE 0.79 06/19/2024       I have personally reviewed all pertinent lab results and reviewed with patient    Imaging       Aden Madison PA-C  Date: 9/30/2024 Time: 2:33 PM  [facility]    This note was written using fluency dictation software. Please excuse any resulting minor grammatical errors.       Not applicable

## 2024-12-03 NOTE — ED PROVIDER NOTE - EYES, MLM
Render In Strict Bullet Format?: No
Detail Level: Zone
Continue Regimen: Apply Clobetasol 0.05% solutions to the scalp nightly.\\n\\nApply Minoxidil 10% compound to the scalp daily.
Clear bilaterally, pupils equal, round and reactive to light.

## 2025-02-08 ENCOUNTER — EMERGENCY (EMERGENCY)
Facility: HOSPITAL | Age: 40
LOS: 1 days | Discharge: HOME | End: 2025-02-08
Attending: STUDENT IN AN ORGANIZED HEALTH CARE EDUCATION/TRAINING PROGRAM
Payer: COMMERCIAL

## 2025-02-08 VITALS
DIASTOLIC BLOOD PRESSURE: 76 MMHG | WEIGHT: 233.69 LBS | OXYGEN SATURATION: 97 % | TEMPERATURE: 98 F | RESPIRATION RATE: 20 BRPM | SYSTOLIC BLOOD PRESSURE: 113 MMHG | HEART RATE: 87 BPM

## 2025-02-08 DIAGNOSIS — Z90.89 ACQUIRED ABSENCE OF OTHER ORGANS: Chronic | ICD-10-CM

## 2025-02-08 DIAGNOSIS — N83.209 UNSPECIFIED OVARIAN CYST, UNSPECIFIED SIDE: Chronic | ICD-10-CM

## 2025-02-08 DIAGNOSIS — Z90.49 ACQUIRED ABSENCE OF OTHER SPECIFIED PARTS OF DIGESTIVE TRACT: Chronic | ICD-10-CM

## 2025-02-08 DIAGNOSIS — Z90.3 ACQUIRED ABSENCE OF STOMACH [PART OF]: Chronic | ICD-10-CM

## 2025-02-08 LAB
ALBUMIN SERPL ELPH-MCNC: 3.7 G/DL — SIGNIFICANT CHANGE UP (ref 3.5–5)
ALP SERPL-CCNC: 68 U/L — SIGNIFICANT CHANGE UP (ref 40–120)
ALT FLD-CCNC: 72 U/L DA — HIGH (ref 10–60)
ANION GAP SERPL CALC-SCNC: 4 MMOL/L — LOW (ref 5–17)
APPEARANCE UR: CLEAR — SIGNIFICANT CHANGE UP
AST SERPL-CCNC: 138 U/L — HIGH (ref 10–40)
BASE EXCESS BLDV CALC-SCNC: -2.9 MMOL/L — SIGNIFICANT CHANGE UP
BASOPHILS # BLD AUTO: 0.01 K/UL — SIGNIFICANT CHANGE UP (ref 0–0.2)
BASOPHILS NFR BLD AUTO: 0.1 % — SIGNIFICANT CHANGE UP (ref 0–2)
BILIRUB SERPL-MCNC: 0.4 MG/DL — SIGNIFICANT CHANGE UP (ref 0.2–1.2)
BILIRUB UR-MCNC: NEGATIVE — SIGNIFICANT CHANGE UP
BLOOD GAS COMMENTS, VENOUS: SIGNIFICANT CHANGE UP
BUN SERPL-MCNC: 10 MG/DL — SIGNIFICANT CHANGE UP (ref 7–18)
CA-I SERPL-SCNC: SIGNIFICANT CHANGE UP MMOL/L (ref 1.15–1.33)
CALCIUM SERPL-MCNC: 8.3 MG/DL — LOW (ref 8.4–10.5)
CHLORIDE SERPL-SCNC: 112 MMOL/L — HIGH (ref 96–108)
CO2 SERPL-SCNC: 23 MMOL/L — SIGNIFICANT CHANGE UP (ref 22–31)
COLOR SPEC: YELLOW — SIGNIFICANT CHANGE UP
CREAT SERPL-MCNC: 0.6 MG/DL — SIGNIFICANT CHANGE UP (ref 0.5–1.3)
DIFF PNL FLD: NEGATIVE — SIGNIFICANT CHANGE UP
EGFR: 118 ML/MIN/1.73M2 — SIGNIFICANT CHANGE UP
EOSINOPHIL # BLD AUTO: 0.04 K/UL — SIGNIFICANT CHANGE UP (ref 0–0.5)
EOSINOPHIL NFR BLD AUTO: 0.4 % — SIGNIFICANT CHANGE UP (ref 0–6)
FLUAV AG NPH QL: SIGNIFICANT CHANGE UP
FLUBV AG NPH QL: SIGNIFICANT CHANGE UP
GAS PNL BLDV: 140 MMOL/L — SIGNIFICANT CHANGE UP (ref 136–145)
GAS PNL BLDV: SIGNIFICANT CHANGE UP
GLUCOSE SERPL-MCNC: 99 MG/DL — SIGNIFICANT CHANGE UP (ref 70–99)
GLUCOSE UR QL: NEGATIVE MG/DL — SIGNIFICANT CHANGE UP
HCO3 BLDV-SCNC: 22 MMOL/L — SIGNIFICANT CHANGE UP (ref 22–29)
HCT VFR BLD CALC: 38.9 % — SIGNIFICANT CHANGE UP (ref 34.5–45)
HGB BLD-MCNC: 13.1 G/DL — SIGNIFICANT CHANGE UP (ref 11.5–15.5)
IMM GRANULOCYTES NFR BLD AUTO: 0.3 % — SIGNIFICANT CHANGE UP (ref 0–0.9)
KETONES UR-MCNC: ABNORMAL MG/DL
LACTATE BLDV-MCNC: 2.1 MMOL/L — HIGH (ref 0.5–2)
LEUKOCYTE ESTERASE UR-ACNC: NEGATIVE — SIGNIFICANT CHANGE UP
LIDOCAIN IGE QN: 31 U/L — SIGNIFICANT CHANGE UP (ref 13–75)
LYMPHOCYTES # BLD AUTO: 0.93 K/UL — LOW (ref 1–3.3)
LYMPHOCYTES # BLD AUTO: 10.1 % — LOW (ref 13–44)
MCHC RBC-ENTMCNC: 28.6 PG — SIGNIFICANT CHANGE UP (ref 27–34)
MCHC RBC-ENTMCNC: 33.7 G/DL — SIGNIFICANT CHANGE UP (ref 32–36)
MCV RBC AUTO: 84.9 FL — SIGNIFICANT CHANGE UP (ref 80–100)
MONOCYTES # BLD AUTO: 0.36 K/UL — SIGNIFICANT CHANGE UP (ref 0–0.9)
MONOCYTES NFR BLD AUTO: 3.9 % — SIGNIFICANT CHANGE UP (ref 2–14)
NEUTROPHILS # BLD AUTO: 7.81 K/UL — HIGH (ref 1.8–7.4)
NEUTROPHILS NFR BLD AUTO: 85.2 % — HIGH (ref 43–77)
NITRITE UR-MCNC: NEGATIVE — SIGNIFICANT CHANGE UP
NRBC # BLD: 0 /100 WBCS — SIGNIFICANT CHANGE UP (ref 0–0)
NRBC BLD-RTO: 0 /100 WBCS — SIGNIFICANT CHANGE UP (ref 0–0)
PCO2 BLDV: 38 MMHG — LOW (ref 39–42)
PH BLDV: 7.37 — SIGNIFICANT CHANGE UP (ref 7.32–7.43)
PH UR: 5 — SIGNIFICANT CHANGE UP (ref 5–8)
PLATELET # BLD AUTO: 277 K/UL — SIGNIFICANT CHANGE UP (ref 150–400)
PO2 BLDV: 34 MMHG — SIGNIFICANT CHANGE UP
POTASSIUM BLDV-SCNC: 3.8 MMOL/L — SIGNIFICANT CHANGE UP (ref 3.5–5.1)
POTASSIUM SERPL-MCNC: 3.5 MMOL/L — SIGNIFICANT CHANGE UP (ref 3.5–5.3)
POTASSIUM SERPL-SCNC: 3.5 MMOL/L — SIGNIFICANT CHANGE UP (ref 3.5–5.3)
PROT SERPL-MCNC: 7.3 G/DL — SIGNIFICANT CHANGE UP (ref 6–8.3)
PROT UR-MCNC: NEGATIVE MG/DL — SIGNIFICANT CHANGE UP
RBC # BLD: 4.58 M/UL — SIGNIFICANT CHANGE UP (ref 3.8–5.2)
RBC # FLD: 13.1 % — SIGNIFICANT CHANGE UP (ref 10.3–14.5)
RSV RNA NPH QL NAA+NON-PROBE: SIGNIFICANT CHANGE UP
SAO2 % BLDV: 49.7 % — SIGNIFICANT CHANGE UP
SARS-COV-2 RNA SPEC QL NAA+PROBE: SIGNIFICANT CHANGE UP
SODIUM SERPL-SCNC: 139 MMOL/L — SIGNIFICANT CHANGE UP (ref 135–145)
SP GR SPEC: 1.03 — SIGNIFICANT CHANGE UP (ref 1–1.03)
UROBILINOGEN FLD QL: 1 MG/DL — SIGNIFICANT CHANGE UP (ref 0.2–1)
WBC # BLD: 9.18 K/UL — SIGNIFICANT CHANGE UP (ref 3.8–10.5)
WBC # FLD AUTO: 9.18 K/UL — SIGNIFICANT CHANGE UP (ref 3.8–10.5)

## 2025-02-08 PROCEDURE — 93010 ELECTROCARDIOGRAM REPORT: CPT

## 2025-02-08 PROCEDURE — 99285 EMERGENCY DEPT VISIT HI MDM: CPT

## 2025-02-08 RX ORDER — DIATRIZOATE MEGLUMINE 60 %
30 VIAL (ML) INJECTION ONCE
Refills: 0 | Status: COMPLETED | OUTPATIENT
Start: 2025-02-08 | End: 2025-02-08

## 2025-02-08 RX ORDER — BACTERIOSTATIC SODIUM CHLORIDE 0.9 %
1000 VIAL (ML) INJECTION ONCE
Refills: 0 | Status: COMPLETED | OUTPATIENT
Start: 2025-02-08 | End: 2025-02-08

## 2025-02-08 RX ADMIN — Medication 1000 MILLILITER(S): at 21:31

## 2025-02-08 RX ADMIN — Medication 30 MILLILITER(S): at 21:38

## 2025-02-08 NOTE — ED PROVIDER NOTE - CLINICAL SUMMARY MEDICAL DECISION MAKING FREE TEXT BOX
39-year-old female with past medical history of gastric sleeve, cholecystectomy coming in with episode of nausea and vomiting today after having brunch.  Of note patient reports for the past couple of years she has been having episodes of bandlike tightening pain around her lower chest and upper abdomen that occurs sporadically and last about 10 minutes.  States sometimes she goes months without having the pain.  Not associated with eating or drinking.  States she has been seen by gastroenterologist and cardiologist for the symptoms.  No fevers, chills.  States today after she had episode of nausea and vomiting she had 5 of those episodes which are not typical.  States generally she only has 1 episode when she does.  No nausea at this time.    Patient is nontoxic-appearing.  No distress.  Mild diffuse tenderness to palpation more in the mid abdomen.  No rebound or guarding.    Differential diagnoses include but not limited to electrolyte abnormalities, anemia, viral illness.  Eval for intra-abdominal pathology.

## 2025-02-08 NOTE — ED PROVIDER NOTE - NSFOLLOWUPCLINICS_GEN_ALL_ED_FT
[facility]  Gastroenterology  [address] [place] [address]  Phone: [phone]  Fax: [phone]

## 2025-02-08 NOTE — ED ADULT NURSE NOTE - NSFALLHARMRISKINTERV_ED_ALL_ED

## 2025-02-08 NOTE — ED ADULT NURSE NOTE - OBJECTIVE STATEMENT
c/o abd pain and epigastric pain that comes and goes for the last 3 months. denies SOB. c/o diarrhea and naesea.

## 2025-02-08 NOTE — ED PROVIDER NOTE - PATIENT PORTAL LINK FT
You can access the FollowMyHealth Patient Portal offered by [facility] by registering at the following website: [email or web address]. By joining [facility]’s FollowMyHealth portal, you will also be able to view your health information using other applications (apps) compatible with our system.

## 2025-02-08 NOTE — ED ADULT NURSE NOTE - CCCP TRG CHIEF CMPLNT
Follow-up call post pain procedure. Left message informing patient to contact the pain clinic at [phone] option #3 regarding any questions or concerns about recent pain procedure.    
abdominal pain

## 2025-02-09 PROCEDURE — 74177 CT ABD & PELVIS W/CONTRAST: CPT | Mod: MC

## 2025-02-09 PROCEDURE — 82803 BLOOD GASES ANY COMBINATION: CPT

## 2025-02-09 PROCEDURE — 83690 ASSAY OF LIPASE: CPT

## 2025-02-09 PROCEDURE — 84295 ASSAY OF SERUM SODIUM: CPT

## 2025-02-09 PROCEDURE — 81003 URINALYSIS AUTO W/O SCOPE: CPT

## 2025-02-09 PROCEDURE — 99284 EMERGENCY DEPT VISIT MOD MDM: CPT | Mod: 25

## 2025-02-09 PROCEDURE — 36415 COLL VENOUS BLD VENIPUNCTURE: CPT

## 2025-02-09 PROCEDURE — 74177 CT ABD & PELVIS W/CONTRAST: CPT | Mod: 26

## 2025-02-09 PROCEDURE — 83605 ASSAY OF LACTIC ACID: CPT

## 2025-02-09 PROCEDURE — 85025 COMPLETE CBC W/AUTO DIFF WBC: CPT

## 2025-02-09 PROCEDURE — 80053 COMPREHEN METABOLIC PANEL: CPT

## 2025-02-09 PROCEDURE — 82330 ASSAY OF CALCIUM: CPT

## 2025-02-09 PROCEDURE — 93005 ELECTROCARDIOGRAM TRACING: CPT

## 2025-02-09 PROCEDURE — 84132 ASSAY OF SERUM POTASSIUM: CPT

## 2025-02-09 PROCEDURE — 84702 CHORIONIC GONADOTROPIN TEST: CPT

## 2025-02-09 PROCEDURE — 87637 SARSCOV2&INF A&B&RSV AMP PRB: CPT

## 2025-03-22 NOTE — ED PROVIDER NOTE - ATTENDING CONTRIBUTION TO CARE
[id number] I performed the initial face to face bedside interview with this patient regarding history of present illness, review of symptoms and past medical, social and family history.  I completed an independent physical examination.  I was the initial provider who evaluated this patient.  The history, review of symptoms and examination was documented by the scribe in my presence and I attest to the accuracy of the documentation.  I have signed out the follow up of any pending tests (i.e. labs, radiological studies) to the PA/NP.  I have discussed the patient’s plan of care and disposition with the PA/NP.

## 2025-08-15 ENCOUNTER — APPOINTMENT (OUTPATIENT)
Dept: OBGYN | Facility: CLINIC | Age: 40
End: 2025-08-15
Payer: COMMERCIAL

## 2025-08-15 VITALS
WEIGHT: 210 LBS | DIASTOLIC BLOOD PRESSURE: 82 MMHG | SYSTOLIC BLOOD PRESSURE: 135 MMHG | BODY MASS INDEX: 31.83 KG/M2 | HEIGHT: 68 IN

## 2025-08-15 DIAGNOSIS — N64.4 MASTODYNIA: ICD-10-CM

## 2025-08-15 PROCEDURE — 99213 OFFICE O/P EST LOW 20 MIN: CPT
